# Patient Record
Sex: FEMALE | Race: WHITE | Employment: UNEMPLOYED | URBAN - METROPOLITAN AREA
[De-identification: names, ages, dates, MRNs, and addresses within clinical notes are randomized per-mention and may not be internally consistent; named-entity substitution may affect disease eponyms.]

---

## 2017-06-08 PROBLEM — G30.0 EARLY ONSET ALZHEIMER'S DEMENTIA WITHOUT BEHAVIORAL DISTURBANCE (HCC): Status: ACTIVE | Noted: 2017-06-08

## 2017-06-08 PROBLEM — F02.80 EARLY ONSET ALZHEIMER'S DEMENTIA WITHOUT BEHAVIORAL DISTURBANCE (HCC): Status: ACTIVE | Noted: 2017-06-08

## 2018-02-27 ENCOUNTER — HOSPITAL ENCOUNTER (OUTPATIENT)
Dept: MAMMOGRAPHY | Age: 58
Discharge: HOME | End: 2018-02-27
Attending: FAMILY MEDICINE
Payer: COMMERCIAL

## 2018-02-27 DIAGNOSIS — Z12.31 SCREENING MAMMOGRAM, ENCOUNTER FOR: ICD-10-CM

## 2018-02-27 PROCEDURE — 77067 SCR MAMMO BI INCL CAD: CPT

## 2018-06-05 PROBLEM — I83.91 VARICOSE VEINS OF RIGHT LOWER EXTREMITY: Status: ACTIVE | Noted: 2018-06-05

## 2018-06-05 PROBLEM — F32.A MILD DEPRESSION: Status: ACTIVE | Noted: 2018-06-05

## 2019-07-22 ENCOUNTER — HOSPITAL ENCOUNTER (OUTPATIENT)
Dept: MAMMOGRAPHY | Age: 59
Discharge: HOME | End: 2019-07-22
Attending: FAMILY MEDICINE
Payer: MEDICARE

## 2019-07-22 DIAGNOSIS — Z12.31 VISIT FOR SCREENING MAMMOGRAM: ICD-10-CM

## 2019-07-22 PROCEDURE — 77067 SCR MAMMO BI INCL CAD: CPT

## 2021-04-06 PROCEDURE — 99285 EMERGENCY DEPT VISIT HI MDM: CPT

## 2021-04-06 PROCEDURE — 81003 URINALYSIS AUTO W/O SCOPE: CPT

## 2021-04-07 ENCOUNTER — APPOINTMENT (OUTPATIENT)
Dept: GENERAL RADIOLOGY | Age: 61
DRG: 100 | End: 2021-04-07
Payer: MEDICARE

## 2021-04-07 ENCOUNTER — APPOINTMENT (OUTPATIENT)
Dept: MRI IMAGING | Age: 61
DRG: 100 | End: 2021-04-07
Attending: STUDENT IN AN ORGANIZED HEALTH CARE EDUCATION/TRAINING PROGRAM
Payer: MEDICARE

## 2021-04-07 ENCOUNTER — HOSPITAL ENCOUNTER (INPATIENT)
Age: 61
LOS: 4 days | Discharge: HOME HEALTH | DRG: 100 | End: 2021-04-11
Admitting: STUDENT IN AN ORGANIZED HEALTH CARE EDUCATION/TRAINING PROGRAM
Payer: MEDICARE

## 2021-04-07 ENCOUNTER — APPOINTMENT (OUTPATIENT)
Dept: CT IMAGING | Age: 61
DRG: 100 | End: 2021-04-07
Payer: MEDICARE

## 2021-04-07 DIAGNOSIS — R56.9 SEIZURE (HCC): ICD-10-CM

## 2021-04-07 DIAGNOSIS — F03.91 DEMENTIA WITH BEHAVIORAL DISTURBANCE, UNSPECIFIED DEMENTIA TYPE: Primary | ICD-10-CM

## 2021-04-07 DIAGNOSIS — G30.0 EARLY ONSET ALZHEIMER'S DEMENTIA WITHOUT BEHAVIORAL DISTURBANCE (HCC): ICD-10-CM

## 2021-04-07 DIAGNOSIS — N39.0 URINARY TRACT INFECTION WITHOUT HEMATURIA, SITE UNSPECIFIED: ICD-10-CM

## 2021-04-07 DIAGNOSIS — F02.80 EARLY ONSET ALZHEIMER'S DEMENTIA WITHOUT BEHAVIORAL DISTURBANCE (HCC): ICD-10-CM

## 2021-04-07 LAB
ALBUMIN SERPL-MCNC: 3.6 G/DL (ref 3.5–5)
ALBUMIN/GLOB SERPL: 0.9 {RATIO} (ref 1.2–3.5)
ALP SERPL-CCNC: 96 U/L (ref 50–136)
ALT SERPL-CCNC: 28 U/L (ref 12–65)
ANION GAP SERPL CALC-SCNC: 5 MMOL/L (ref 7–16)
AST SERPL-CCNC: 27 U/L (ref 15–37)
ATRIAL RATE: 64 BPM
BACTERIA URNS QL MICRO: ABNORMAL /HPF
BASOPHILS # BLD: 0.1 K/UL (ref 0–0.2)
BASOPHILS NFR BLD: 0 % (ref 0–2)
BILIRUB SERPL-MCNC: 0.3 MG/DL (ref 0.2–1.1)
BUN SERPL-MCNC: 26 MG/DL (ref 6–23)
CALCIUM SERPL-MCNC: 9.4 MG/DL (ref 8.3–10.4)
CALCULATED P AXIS, ECG09: 38 DEGREES
CALCULATED R AXIS, ECG10: 67 DEGREES
CALCULATED T AXIS, ECG11: 83 DEGREES
CASTS URNS QL MICRO: ABNORMAL /LPF
CHLORIDE SERPL-SCNC: 110 MMOL/L (ref 98–107)
CO2 SERPL-SCNC: 24 MMOL/L (ref 21–32)
CREAT SERPL-MCNC: 1.09 MG/DL (ref 0.6–1)
DIAGNOSIS, 93000: NORMAL
DIFFERENTIAL METHOD BLD: ABNORMAL
EOSINOPHIL # BLD: 0.2 K/UL (ref 0–0.8)
EOSINOPHIL NFR BLD: 1 % (ref 0.5–7.8)
EPI CELLS #/AREA URNS HPF: 0 /HPF
ERYTHROCYTE [DISTWIDTH] IN BLOOD BY AUTOMATED COUNT: 12.7 % (ref 11.9–14.6)
GLOBULIN SER CALC-MCNC: 3.9 G/DL (ref 2.3–3.5)
GLUCOSE BLD STRIP.AUTO-MCNC: 114 MG/DL (ref 65–100)
GLUCOSE SERPL-MCNC: 122 MG/DL (ref 65–100)
HCT VFR BLD AUTO: 40.5 % (ref 35.8–46.3)
HGB BLD-MCNC: 13.6 G/DL (ref 11.7–15.4)
IMM GRANULOCYTES # BLD AUTO: 0.3 K/UL (ref 0–0.5)
IMM GRANULOCYTES NFR BLD AUTO: 2 % (ref 0–5)
LACTATE SERPL-SCNC: 2 MMOL/L (ref 0.4–2)
LACTATE SERPL-SCNC: 2.6 MMOL/L (ref 0.4–2)
LYMPHOCYTES # BLD: 2.5 K/UL (ref 0.5–4.6)
LYMPHOCYTES NFR BLD: 16 % (ref 13–44)
MCH RBC QN AUTO: 31.9 PG (ref 26.1–32.9)
MCHC RBC AUTO-ENTMCNC: 33.6 G/DL (ref 31.4–35)
MCV RBC AUTO: 94.8 FL (ref 79.6–97.8)
MONOCYTES # BLD: 1 K/UL (ref 0.1–1.3)
MONOCYTES NFR BLD: 6 % (ref 4–12)
NEUTS SEG # BLD: 11.8 K/UL (ref 1.7–8.2)
NEUTS SEG NFR BLD: 75 % (ref 43–78)
NRBC # BLD: 0 K/UL (ref 0–0.2)
P-R INTERVAL, ECG05: 172 MS
PLATELET # BLD AUTO: 303 K/UL (ref 150–450)
PMV BLD AUTO: 8.7 FL (ref 9.4–12.3)
POTASSIUM SERPL-SCNC: 4.2 MMOL/L (ref 3.5–5.1)
PROT SERPL-MCNC: 7.5 G/DL (ref 6.3–8.2)
Q-T INTERVAL, ECG07: 428 MS
QRS DURATION, ECG06: 98 MS
QTC CALCULATION (BEZET), ECG08: 441 MS
RBC # BLD AUTO: 4.27 M/UL (ref 4.05–5.2)
RBC #/AREA URNS HPF: ABNORMAL /HPF
SERVICE CMNT-IMP: ABNORMAL
SODIUM SERPL-SCNC: 139 MMOL/L (ref 136–145)
TSH SERPL DL<=0.005 MIU/L-ACNC: 2.1 UIU/ML (ref 0.36–3.74)
VENTRICULAR RATE, ECG03: 64 BPM
WBC # BLD AUTO: 15.8 K/UL (ref 4.3–11.1)
WBC URNS QL MICRO: ABNORMAL /HPF

## 2021-04-07 PROCEDURE — 74011000258 HC RX REV CODE- 258: Performed by: STUDENT IN AN ORGANIZED HEALTH CARE EDUCATION/TRAINING PROGRAM

## 2021-04-07 PROCEDURE — A9576 INJ PROHANCE MULTIPACK: HCPCS | Performed by: STUDENT IN AN ORGANIZED HEALTH CARE EDUCATION/TRAINING PROGRAM

## 2021-04-07 PROCEDURE — 65270000029 HC RM PRIVATE

## 2021-04-07 PROCEDURE — 96375 TX/PRO/DX INJ NEW DRUG ADDON: CPT

## 2021-04-07 PROCEDURE — 81015 MICROSCOPIC EXAM OF URINE: CPT

## 2021-04-07 PROCEDURE — 77010033678 HC OXYGEN DAILY

## 2021-04-07 PROCEDURE — 71045 X-RAY EXAM CHEST 1 VIEW: CPT

## 2021-04-07 PROCEDURE — 95816 EEG AWAKE AND DROWSY: CPT | Performed by: STUDENT IN AN ORGANIZED HEALTH CARE EDUCATION/TRAINING PROGRAM

## 2021-04-07 PROCEDURE — 96372 THER/PROPH/DIAG INJ SC/IM: CPT

## 2021-04-07 PROCEDURE — 74011636320 HC RX REV CODE- 636/320: Performed by: STUDENT IN AN ORGANIZED HEALTH CARE EDUCATION/TRAINING PROGRAM

## 2021-04-07 PROCEDURE — 74011000258 HC RX REV CODE- 258

## 2021-04-07 PROCEDURE — 80053 COMPREHEN METABOLIC PANEL: CPT

## 2021-04-07 PROCEDURE — 96376 TX/PRO/DX INJ SAME DRUG ADON: CPT

## 2021-04-07 PROCEDURE — 92610 EVALUATE SWALLOWING FUNCTION: CPT

## 2021-04-07 PROCEDURE — 96366 THER/PROPH/DIAG IV INF ADDON: CPT

## 2021-04-07 PROCEDURE — 70553 MRI BRAIN STEM W/O & W/DYE: CPT

## 2021-04-07 PROCEDURE — 70450 CT HEAD/BRAIN W/O DYE: CPT

## 2021-04-07 PROCEDURE — 83605 ASSAY OF LACTIC ACID: CPT

## 2021-04-07 PROCEDURE — 94760 N-INVAS EAR/PLS OXIMETRY 1: CPT

## 2021-04-07 PROCEDURE — 82962 GLUCOSE BLOOD TEST: CPT

## 2021-04-07 PROCEDURE — 85025 COMPLETE CBC W/AUTO DIFF WBC: CPT

## 2021-04-07 PROCEDURE — 84443 ASSAY THYROID STIM HORMONE: CPT

## 2021-04-07 PROCEDURE — 96365 THER/PROPH/DIAG IV INF INIT: CPT

## 2021-04-07 PROCEDURE — 2709999900 HC NON-CHARGEABLE SUPPLY

## 2021-04-07 PROCEDURE — 74011250636 HC RX REV CODE- 250/636

## 2021-04-07 PROCEDURE — 93005 ELECTROCARDIOGRAM TRACING: CPT

## 2021-04-07 PROCEDURE — 87086 URINE CULTURE/COLONY COUNT: CPT

## 2021-04-07 PROCEDURE — 74011250637 HC RX REV CODE- 250/637: Performed by: STUDENT IN AN ORGANIZED HEALTH CARE EDUCATION/TRAINING PROGRAM

## 2021-04-07 PROCEDURE — 74011250636 HC RX REV CODE- 250/636: Performed by: STUDENT IN AN ORGANIZED HEALTH CARE EDUCATION/TRAINING PROGRAM

## 2021-04-07 RX ORDER — DIPHENHYDRAMINE HYDROCHLORIDE 50 MG/ML
25 INJECTION, SOLUTION INTRAMUSCULAR; INTRAVENOUS
Status: COMPLETED | OUTPATIENT
Start: 2021-04-07 | End: 2021-04-07

## 2021-04-07 RX ORDER — DONEPEZIL HYDROCHLORIDE 5 MG/1
10 TABLET, FILM COATED ORAL DAILY
Status: DISCONTINUED | OUTPATIENT
Start: 2021-04-07 | End: 2021-04-11 | Stop reason: HOSPADM

## 2021-04-07 RX ORDER — LORAZEPAM 2 MG/ML
1 INJECTION INTRAMUSCULAR
Status: COMPLETED | OUTPATIENT
Start: 2021-04-07 | End: 2021-04-07

## 2021-04-07 RX ORDER — LORAZEPAM 0.5 MG/1
0.5 TABLET ORAL 2 TIMES DAILY
Status: DISCONTINUED | OUTPATIENT
Start: 2021-04-07 | End: 2021-04-11 | Stop reason: HOSPADM

## 2021-04-07 RX ORDER — FACIAL-BODY WIPES
10 EACH TOPICAL DAILY PRN
Status: DISCONTINUED | OUTPATIENT
Start: 2021-04-07 | End: 2021-04-11 | Stop reason: HOSPADM

## 2021-04-07 RX ORDER — METOPROLOL SUCCINATE 50 MG/1
50 TABLET, EXTENDED RELEASE ORAL DAILY
COMMUNITY

## 2021-04-07 RX ORDER — ACETAMINOPHEN 650 MG/1
650 SUPPOSITORY RECTAL
Status: DISCONTINUED | OUTPATIENT
Start: 2021-04-07 | End: 2021-04-11 | Stop reason: HOSPADM

## 2021-04-07 RX ORDER — MEMANTINE HYDROCHLORIDE 5 MG/1
10 TABLET ORAL 2 TIMES DAILY
Status: DISCONTINUED | OUTPATIENT
Start: 2021-04-07 | End: 2021-04-11 | Stop reason: HOSPADM

## 2021-04-07 RX ORDER — PROPRANOLOL HYDROCHLORIDE 20 MG/1
20 TABLET ORAL 2 TIMES DAILY
COMMUNITY

## 2021-04-07 RX ORDER — ONDANSETRON 2 MG/ML
4 INJECTION INTRAMUSCULAR; INTRAVENOUS
Status: DISCONTINUED | OUTPATIENT
Start: 2021-04-07 | End: 2021-04-11 | Stop reason: HOSPADM

## 2021-04-07 RX ORDER — LORAZEPAM 1 MG/1
1 TABLET ORAL 3 TIMES DAILY
Status: ON HOLD | COMMUNITY
End: 2021-04-11 | Stop reason: SDUPTHER

## 2021-04-07 RX ORDER — LORAZEPAM 2 MG/ML
INJECTION INTRAMUSCULAR
Status: COMPLETED
Start: 2021-04-07 | End: 2021-04-07

## 2021-04-07 RX ORDER — AMLODIPINE BESYLATE 5 MG/1
5 TABLET ORAL DAILY
Status: DISCONTINUED | OUTPATIENT
Start: 2021-04-07 | End: 2021-04-09

## 2021-04-07 RX ORDER — QUETIAPINE FUMARATE 25 MG/1
25 TABLET, FILM COATED ORAL
COMMUNITY

## 2021-04-07 RX ORDER — LORAZEPAM 0.5 MG/1
1 TABLET ORAL
Qty: 9 TAB | Refills: 0 | Status: SHIPPED | OUTPATIENT
Start: 2021-04-07 | End: 2021-04-11

## 2021-04-07 RX ORDER — METOPROLOL SUCCINATE 50 MG/1
50 TABLET, EXTENDED RELEASE ORAL DAILY
Status: DISCONTINUED | OUTPATIENT
Start: 2021-04-07 | End: 2021-04-11 | Stop reason: HOSPADM

## 2021-04-07 RX ORDER — HALOPERIDOL 5 MG/ML
10 INJECTION INTRAMUSCULAR
Status: DISCONTINUED | OUTPATIENT
Start: 2021-04-07 | End: 2021-04-07

## 2021-04-07 RX ORDER — CEFPODOXIME PROXETIL 100 MG/1
100 TABLET, FILM COATED ORAL 2 TIMES DAILY
Qty: 14 TAB | Refills: 0 | Status: SHIPPED | OUTPATIENT
Start: 2021-04-07 | End: 2021-04-11 | Stop reason: SDUPTHER

## 2021-04-07 RX ORDER — SODIUM CHLORIDE 0.9 % (FLUSH) 0.9 %
5-40 SYRINGE (ML) INJECTION EVERY 8 HOURS
Status: DISCONTINUED | OUTPATIENT
Start: 2021-04-07 | End: 2021-04-11 | Stop reason: HOSPADM

## 2021-04-07 RX ORDER — LORAZEPAM 2 MG/ML
2 INJECTION INTRAMUSCULAR
Status: DISCONTINUED | OUTPATIENT
Start: 2021-04-07 | End: 2021-04-11 | Stop reason: HOSPADM

## 2021-04-07 RX ORDER — SODIUM CHLORIDE 9 MG/ML
75 INJECTION, SOLUTION INTRAVENOUS CONTINUOUS
Status: DISCONTINUED | OUTPATIENT
Start: 2021-04-07 | End: 2021-04-09

## 2021-04-07 RX ORDER — PROMETHAZINE HYDROCHLORIDE 25 MG/1
25 TABLET ORAL
Status: DISCONTINUED | OUTPATIENT
Start: 2021-04-07 | End: 2021-04-11 | Stop reason: HOSPADM

## 2021-04-07 RX ORDER — POLYETHYLENE GLYCOL 3350 17 G/17G
17 POWDER, FOR SOLUTION ORAL DAILY PRN
Status: DISCONTINUED | OUTPATIENT
Start: 2021-04-07 | End: 2021-04-11 | Stop reason: HOSPADM

## 2021-04-07 RX ORDER — FAMOTIDINE 20 MG/1
20 TABLET, FILM COATED ORAL
Status: DISCONTINUED | OUTPATIENT
Start: 2021-04-07 | End: 2021-04-11 | Stop reason: HOSPADM

## 2021-04-07 RX ORDER — ACETAMINOPHEN 325 MG/1
650 TABLET ORAL
Status: DISCONTINUED | OUTPATIENT
Start: 2021-04-07 | End: 2021-04-11 | Stop reason: HOSPADM

## 2021-04-07 RX ORDER — ESCITALOPRAM OXALATE 10 MG/1
10 TABLET ORAL EVERY EVENING
Status: DISCONTINUED | OUTPATIENT
Start: 2021-04-07 | End: 2021-04-09

## 2021-04-07 RX ORDER — SODIUM CHLORIDE 0.9 % (FLUSH) 0.9 %
10 SYRINGE (ML) INJECTION
Status: COMPLETED | OUTPATIENT
Start: 2021-04-07 | End: 2021-04-07

## 2021-04-07 RX ORDER — FAMOTIDINE 20 MG/1
20 TABLET, FILM COATED ORAL 2 TIMES DAILY
Status: DISCONTINUED | OUTPATIENT
Start: 2021-04-07 | End: 2021-04-11 | Stop reason: HOSPADM

## 2021-04-07 RX ORDER — ENOXAPARIN SODIUM 100 MG/ML
40 INJECTION SUBCUTANEOUS DAILY
Status: DISCONTINUED | OUTPATIENT
Start: 2021-04-07 | End: 2021-04-08

## 2021-04-07 RX ORDER — HALOPERIDOL 5 MG/ML
5 INJECTION INTRAMUSCULAR
Status: COMPLETED | OUTPATIENT
Start: 2021-04-07 | End: 2021-04-07

## 2021-04-07 RX ORDER — PROPRANOLOL HYDROCHLORIDE 10 MG/1
20 TABLET ORAL 2 TIMES DAILY
Status: DISCONTINUED | OUTPATIENT
Start: 2021-04-07 | End: 2021-04-11 | Stop reason: HOSPADM

## 2021-04-07 RX ORDER — SODIUM CHLORIDE 0.9 % (FLUSH) 0.9 %
5-40 SYRINGE (ML) INJECTION AS NEEDED
Status: DISCONTINUED | OUTPATIENT
Start: 2021-04-07 | End: 2021-04-11 | Stop reason: HOSPADM

## 2021-04-07 RX ADMIN — LORAZEPAM 1 MG: 2 INJECTION INTRAMUSCULAR; INTRAVENOUS at 08:20

## 2021-04-07 RX ADMIN — PROPRANOLOL HYDROCHLORIDE 20 MG: 10 TABLET ORAL at 18:19

## 2021-04-07 RX ADMIN — Medication 10 ML: at 14:28

## 2021-04-07 RX ADMIN — PROPRANOLOL HYDROCHLORIDE 20 MG: 10 TABLET ORAL at 13:42

## 2021-04-07 RX ADMIN — AMLODIPINE BESYLATE 5 MG: 5 TABLET ORAL at 13:42

## 2021-04-07 RX ADMIN — FAMOTIDINE 20 MG: 20 TABLET ORAL at 13:42

## 2021-04-07 RX ADMIN — HALOPERIDOL LACTATE 5 MG: 5 INJECTION, SOLUTION INTRAMUSCULAR at 01:18

## 2021-04-07 RX ADMIN — MEMANTINE HYDROCHLORIDE 10 MG: 5 TABLET ORAL at 13:42

## 2021-04-07 RX ADMIN — ESCITALOPRAM OXALATE 10 MG: 10 TABLET ORAL at 18:19

## 2021-04-07 RX ADMIN — METOPROLOL SUCCINATE 50 MG: 50 TABLET, EXTENDED RELEASE ORAL at 13:42

## 2021-04-07 RX ADMIN — MEMANTINE HYDROCHLORIDE 10 MG: 5 TABLET ORAL at 18:19

## 2021-04-07 RX ADMIN — LEVETIRACETAM 500 MG: 100 INJECTION, SOLUTION INTRAVENOUS at 13:42

## 2021-04-07 RX ADMIN — ENOXAPARIN SODIUM 40 MG: 40 INJECTION SUBCUTANEOUS at 13:42

## 2021-04-07 RX ADMIN — Medication 10 ML: at 16:19

## 2021-04-07 RX ADMIN — LORAZEPAM 1 MG: 2 INJECTION INTRAMUSCULAR; INTRAVENOUS at 01:19

## 2021-04-07 RX ADMIN — LORAZEPAM 0.5 MG: 0.5 TABLET ORAL at 12:44

## 2021-04-07 RX ADMIN — CEFTRIAXONE SODIUM 1 G: 1 INJECTION, POWDER, FOR SOLUTION INTRAMUSCULAR; INTRAVENOUS at 06:36

## 2021-04-07 RX ADMIN — SODIUM CHLORIDE 75 ML/HR: 900 INJECTION, SOLUTION INTRAVENOUS at 10:17

## 2021-04-07 RX ADMIN — SODIUM CHLORIDE 1000 ML: 900 INJECTION, SOLUTION INTRAVENOUS at 06:38

## 2021-04-07 RX ADMIN — LORAZEPAM 2 MG: 2 INJECTION INTRAMUSCULAR; INTRAVENOUS at 14:12

## 2021-04-07 RX ADMIN — DIPHENHYDRAMINE HYDROCHLORIDE 25 MG: 50 INJECTION, SOLUTION INTRAMUSCULAR; INTRAVENOUS at 01:19

## 2021-04-07 RX ADMIN — FAMOTIDINE 20 MG: 20 TABLET ORAL at 18:19

## 2021-04-07 RX ADMIN — GADOTERIDOL 17 ML: 279.3 INJECTION, SOLUTION INTRAVENOUS at 16:19

## 2021-04-07 RX ADMIN — Medication 10 ML: at 21:29

## 2021-04-07 NOTE — PROGRESS NOTES
Initial visit in the ER, a spiritual presence, emotional presence and prayer were provided for the patient.        Xu Escoto, [address] [facility], [facility]

## 2021-04-07 NOTE — DISCHARGE INSTRUCTIONS
Please continue to take Ativan 3 times daily at lower dose to prevent withdrawal. Please discuss with your neurologist further medication recommendations. We have not continued Keppra during this hospitalization as we believe the seizure was provoked by the benzo withdrawal. We are setting you up with home health therapy to assist with your continued physical therapy and occupational therapy at home.

## 2021-04-07 NOTE — ED PROVIDER NOTES
61-year-old female brought into the ER by EMS for abnormal behavior. Patient family reports that she has \"stage VII dementia\" and her dementia had early onset.  states that they ran out of her Ativan today she is not had any since yesterday this is when her behavior started to change. Her  reports several episodes of shaking that lasted for 30 seconds each does not have all the characteristics of withdrawal seizures. Altered mental status   This is a chronic problem. The current episode started yesterday. The problem has not changed since onset. Associated symptoms include confusion and agitation. Mental status baseline is severe dementia. Risk factors include dementia. Past Medical History:   Diagnosis Date    Menopause        Past Surgical History:   Procedure Laterality Date    HX BREAST REDUCTION Bilateral 1982         No family history on file.     Social History     Socioeconomic History    Marital status:      Spouse name: Not on file    Number of children: Not on file    Years of education: Not on file    Highest education level: Not on file   Occupational History    Not on file   Social Needs    Financial resource strain: Not on file    Food insecurity     Worry: Not on file     Inability: Not on file    Transportation needs     Medical: Not on file     Non-medical: Not on file   Tobacco Use    Smoking status: Never Smoker    Smokeless tobacco: Never Used   Substance and Sexual Activity    Alcohol use: Yes     Comment: Wine    Drug use: Not on file    Sexual activity: Not on file   Lifestyle    Physical activity     Days per week: Not on file     Minutes per session: Not on file    Stress: Not on file   Relationships    Social connections     Talks on phone: Not on file     Gets together: Not on file     Attends Ca[employer]ism service: Not on file     Active member of club or organization: Not on file     Attends meetings of clubs or organizations: Not on file Relationship status: Not on file    Intimate partner violence     Fear of current or ex partner: Not on file     Emotionally abused: Not on file     Physically abused: Not on file     Forced sexual activity: Not on file   Other Topics Concern    Not on file   Social History Narrative    Not on file         ALLERGIES: Patient has no known allergies. Review of Systems   Constitutional: Negative. Negative for activity change. HENT: Negative. Eyes: Negative. Respiratory: Negative. Cardiovascular: Negative. Gastrointestinal: Negative. Genitourinary: Negative. Musculoskeletal: Negative. Skin: Negative. Neurological: Negative. Psychiatric/Behavioral: Positive for agitation and confusion. All other systems reviewed and are negative. There were no vitals filed for this visit. Physical Exam  Vitals signs and nursing note reviewed. Constitutional:       General: She is not in acute distress. Appearance: Normal appearance. She is well-developed. She is not ill-appearing, toxic-appearing or diaphoretic. HENT:      Head: Normocephalic and atraumatic. No right periorbital erythema or left periorbital erythema. Jaw: There is normal jaw occlusion. Salivary Glands: Right salivary gland is not diffusely enlarged. Left salivary gland is not diffusely enlarged. Right Ear: External ear normal.      Left Ear: External ear normal.      Nose: Nose normal. No congestion or rhinorrhea. Mouth/Throat:      Mouth: Mucous membranes are moist.      Pharynx: No oropharyngeal exudate or posterior oropharyngeal erythema. Eyes:      General: Lids are normal. No scleral icterus. Right eye: No discharge. Left eye: No discharge. Extraocular Movements: Extraocular movements intact. Conjunctiva/sclera: Conjunctivae normal.      Right eye: Right conjunctiva is not injected. Left eye: Left conjunctiva is not injected.       Pupils: Pupils are equal, round, and reactive to light. Neck:      Musculoskeletal: Full passive range of motion without pain, normal range of motion and neck supple. Normal range of motion. No erythema, neck rigidity, injury, pain with movement or muscular tenderness. Thyroid: No thyroid mass. Vascular: No JVD. Trachea: Trachea and phonation normal.   Cardiovascular:      Rate and Rhythm: Normal rate and regular rhythm. Pulses: Normal pulses. Heart sounds: Normal heart sounds. Heart sounds not distant. No murmur. No friction rub. No gallop. Pulmonary:      Effort: Pulmonary effort is normal. No tachypnea, accessory muscle usage, respiratory distress or retractions. Breath sounds: No stridor. No decreased breath sounds, wheezing, rhonchi or rales. Chest:      Chest wall: No tenderness. Abdominal:      General: Abdomen is flat. Bowel sounds are normal. There is no distension. There are no signs of injury. Palpations: Abdomen is soft. There is no fluid wave, mass or pulsatile mass. Tenderness: There is no abdominal tenderness. There is no guarding or rebound. Musculoskeletal: Normal range of motion. General: No swelling, tenderness, deformity or signs of injury. Right lower leg: No edema. Left lower leg: No edema. Lymphadenopathy:      Cervical: No cervical adenopathy. Skin:     General: Skin is warm and dry. Capillary Refill: Capillary refill takes less than 2 seconds. Coloration: Skin is not jaundiced or pale. Findings: No bruising, erythema or rash. Neurological:      General: No focal deficit present. Mental Status: She is alert and oriented to person, place, and time. Mental status is at baseline. GCS: GCS eye subscore is 4. GCS verbal subscore is 5. GCS motor subscore is 6. Cranial Nerves: No dysarthria or facial asymmetry. Sensory: No sensory deficit. Motor: No weakness or tremor.       Coordination: Coordination normal.   Psychiatric:         Attention and Perception: She is inattentive. Mood and Affect: Mood normal. Affect is inappropriate. Behavior: Behavior normal. Behavior is cooperative. Thought Content: Thought content normal.         Cognition and Memory: Cognition is impaired. Memory is impaired. She exhibits impaired recent memory and impaired remote memory. Judgment: Judgment is impulsive. MDM  Number of Diagnoses or Management Options  Dementia with behavioral disturbance, unspecified dementia type [facility])  Diagnosis management comments:  is concerned that her prescription for Ativan has run out and he could not get his doctor's office to address this problem yesterday. She has been on Ativan according to the care provider for quite some time. The shaking type behavior that he witnessed does not sound like a true seizure or withdrawal seizures. due to the effects of withdrawing from benzodiazepine patient will be giving a very short course and time to talk to her primary care doctor about further care. Patient does demonstrate some behavior that does not seem to be dementia related but more psychiatric.        Amount and/or Complexity of Data Reviewed  Clinical lab tests: ordered and reviewed  Tests in the radiology section of CPT®: ordered and reviewed  Tests in the medicine section of CPT®: ordered and reviewed  Decide to obtain previous medical records or to obtain history from someone other than the patient: yes  Review and summarize past medical records: yes  Independent visualization of images, tracings, or specimens: yes    Risk of Complications, Morbidity, and/or Mortality  Presenting problems: high  Diagnostic procedures: high  Management options: high    Patient Progress  Patient progress: stable         Procedures

## 2021-04-07 NOTE — PROGRESS NOTES
LTG: Patient will tolerate least restrictive diet without overt signs or symptoms of airway compromise. STG: Patient will tolerate po trials without overt signs or symptoms of airway compromise in efforts to identify safest oral diet  STG: Patient will participate in modified barium swallow study as clinically indicated. SPEECH LANGUAGE PATHOLOGY: DYSPHAGIA- Initial Assessment    NAME/AGE/GENDER: Bettye Barrett is a 64 y.o. female  DATE: 4/7/2021  PRIMARY DIAGNOSIS: Seizure ([facility] [address] [R56.9]      ICD-10: Treatment Diagnosis: R13.12 Dysphagia, Oropharyngeal Phase    RECOMMENDATIONS   DIET:    NPO    MEDICATIONS: Crushed in puree  if critical     ASPIRATION PRECAUTIONS  · Slow rate of intake  · Small bites/sips  · Upright at 90 degrees during meal     COMPENSATORY STRATEGIES/MODIFICATIONS  · None     RECOMMENDATIONS for CONTINUED SPEECH THERAPY:   YES: Anticipate need for ongoing speech therapy during this hospitalization and at next level of care. ASSESSMENT   Patient presents with oral dysphagia complicated by impaired cognition. No overt s/sx airway compromise with puree and ice chips. Patient unable to drink via cup and straw due to mental status-biting straw and licking water from cup. Recommend NPO except critical meds crushed in puree when awake/alert. EDUCATION:  · Recommendations discussed with Patient and RN  CONTINUATION OF SKILLED SERVICES/MEDICAL NECESSITY:   Patient is expected to demonstrate progress in  swallow strength, swallow timeliness, swallow function, diet tolerance and swallow safety in order to  improve swallow safety, work toward diet advancement and decrease aspiration risk.  Patient continues to require skilled intervention due to dysphagia. REHABILITATION POTENTIAL FOR STATED GOALS: Good    PLAN    FREQUENCY/DURATION: Continue to follow patient 3 times a week for duration of hospital stay to address above goals.     - Recommendations for next treatment session: Next treatment session will address po trials    SUBJECTIVE   Positioned upright. perseverative and confused. Automatic phrases and repetitive and inconsistent. Oxygen Device: NC  Pain: Pain Scale 1: FLACC  Pain Intensity 1: 0    History of Present Injury/Illness: Ms. Brenda Martinez  has a past medical history of Alzheimer's dementia ([facility] [address] Hypertension, and Menopause. . She also  has a past surgical history that includes hx breast reduction (Bilateral, 1982). PRECAUTIONS/ALLERGIES: Patient has no known allergies. Problem List:  (Impairments causing functional limitations):  1. Oropharyngeal dysphagia     Previous Dysphagia: RN reports  denied any at bedside.  reports eats regular/thin at baseline. Failed stand due to impaired level of alertness. Diet Prior to Evaluation: NPO    Orientation:  name in yes/no format    Cognitive-Linguistic Screening:   Alertness  o Alert   Speech Production:   o Fully intelligible   Expressive Language:  o perseverative; automatic speech    o Able to express likes/dislikes but also noted inconsistencies with this    Receptive Language:  o unable to follow commands or answer questions appropriately due to confusion   Cognition:   o Impaired. Advanced dementia at baseline per chart. Prior Level of Function: requires care from . Recommendations: Given results of screening, no acute evaluation warranted    OBJECTIVE   Oral Motor:   · COMMENTS: no command following    Dysphagia evaluation:   Patient consumed trials of ice chips and puree. Sticking tongue out and licking water across multiple cup presentations and biting straw. Adequate acceptance and oral clearance with puree and meds crushed in puree with RN present. No overt s/sx airway compromise.        Tool Used: Dysphagia Outcome and Severity Scale (CARLA)    Score Comments   Normal Diet  [] 7 With no strategies or extra time needed   Functional Swallow  [] 6 May have mild oral or pharyngeal delay   Mild Dysphagia  [] 5 Which may require one diet consistency restricted    Mild-Moderate Dysphagia  [] 4 With 1-2 diet consistencies restricted   Moderate Dysphagia  [] 3 With 2 or more diet consistencies restricted   Moderate-Severe Dysphagia  [] 2 With partial PO strategies (trials with ST only)   Severe Dysphagia  [] 1 With inability to tolerate any PO safely      Score:  Initial: 2 Most Recent: x (Date 04/07/21 )   Interpretation of Tool: The Dysphagia Outcome and Severity Scale (CARLA) is a simple, easy-to-use, 7-point scale developed to systematically rate the functional severity of dysphagia based on objective assessment and make recommendations for diet level, independence level, and type of nutrition. Current Medications:   No current facility-administered medications on file prior to encounter. Current Outpatient Medications on File Prior to Encounter   Medication Sig Dispense Refill    metoprolol succinate (Toprol XL) 50 mg XL tablet Take 50 mg by mouth daily.  LORazepam (Ativan) 1 mg tablet Take 1 mg by mouth three (3) times daily.  propranoloL (INDERAL) 20 mg tablet Take 20 mg by mouth two (2) times a day.  QUEtiapine (SEROqueL) 25 mg tablet Take 25 mg by mouth nightly.  escitalopram oxalate (LEXAPRO) 10 mg tablet 1 every day for depression (Patient taking differently: Take 30 mg by mouth daily. 1 every day for depression) 90 Tab 12    donepezil (ARICEPT) 10 mg tablet 1 every day for memory (Patient taking differently: 10 mg nightly. 1 every day for memory) 90 Tab 6    memantine (NAMENDA) 10 mg tablet 1 every day for memory (Patient taking differently: Take 10 mg by mouth two (2) times a day.  1 every day for memory) 90 Tab 12    metroNIDAZOLE (METROGEL) 1 % topical gel Apply qhs for rosacea 45 g 0    [DISCONTINUED] estradiol (ESTRACE) 0.5 mg tablet 1 every day for hormone replacement 90 Tab 12    [DISCONTINUED] progesterone (PROMETRIUM) 100 mg capsule 1 every day for hormone replacement 90 Cap 12    [DISCONTINUED] amLODIPine-benazepril (LOTREL) 5-40 mg per capsule 1 every day for BP 90 Cap 0        INTERDISCIPLINARY COLLABORATION: RN    After treatment position/precautions:  · Upright in bed  · RN notified   · RN at bedside    Total Treatment Duration:   Time In: 1164  Time Out: 2809 [facility] RICKEY TERRIE QUIROZ, CCC-SLP

## 2021-04-07 NOTE — ED TRIAGE NOTES
Patient arrives via EMS from home for altered mental status. Family states patient has run out of ativan which she usually takes every day. patient has history of alzheimer's. EMS noted O2 sat of 88-90% placed on 3L nc with improved to 95-97%, patient does not wear oxygen at home. EMS . Family stated this is a similar behavior as a previous UTI.

## 2021-04-07 NOTE — ED NOTES
TRANSFER - OUT REPORT:    Verbal report given to Noris(name) on Suhail Christian  being transferred to [facility](unit) for routine progression of care       Report consisted of patients Situation, Background, Assessment and   Recommendations(SBAR). Information from the following report(s) SBAR was reviewed with the receiving nurse. Lines:   Peripheral IV 04/07/21 Right Hand (Active)   Site Assessment Clean, dry, & intact 04/07/21 0613   Phlebitis Assessment 0 04/07/21 0613   Infiltration Assessment 0 04/07/21 0613   Dressing Status Clean, dry, & intact 04/07/21 0613   Dressing Type 4 X 4;Transparent 04/07/21 0613   Hub Color/Line Status Pink 04/07/21 [id number]        Opportunity for questions and clarification was provided.       Patient transported with:   O2 @ 3 liters

## 2021-04-07 NOTE — H&P
[facility] History and Physical       Name:  Bill Mclena  Age:60 y.o. Sex:female   :  1960    MRN:  [id number]   PCP:  Reida Lefort., MD      Admit Date:  2021 12:33 AM   Chief Complaint: Seizure    Reason for Admission:   Seizure [facility]) [R56.9]    Assessment & Plan:     #Seizures  - s/p Ativan IV in ED for seizure activity  - likely from benzo withdrawal but may be at risk with underlying dementia  - start on Keppra IV BID  - will cont with ativan 0.5mg BID at lower dose (takes 1mg TID) to prevent possible withdrawal seizures  - obtain EEG and Brain MRI   - ativan IV prn for seizure activity  - consult neurology for assistance, follows Dr. Sai Moeller with neurology in outpatient setting    #UTI  - UA mildly positive  - will cover with rocephin  - follow up UCx    #IGNACIO  - Cr 1.09  - start on IVF  - recheck in AM    #Alzheimers Dementia  - cont home namenda and aricept  - cont seroquel 25mg at bedtime  - cont home lexapro    #HTN  - cont home toprol and propanolol    Disposition/Expected LOS: 1-2 midnight hospital stay for seiuzre workup. PT/OT/ST consulted  Diet: DIET NPO  VTE ppx: lovenox  GI ppx: pepcid  Code status: Full CODE  Surrogate decision-maker:  at bedside, Wilber Nation, [phone]      History of Presenting Illness:     Bill Mclean is a 64 y.o. female with medical history of early onset Alzheimer's dementia, hypertension, chronic benzo use, who presented to ED with seizure. Patient altered and likely postictal my examination the ED, Gilberto history obtained from  at bedside.  states that he put patient to bed last night in her normal state of health without any acute issues. She woke up in walk downstairs, which is not unusual for her with her underlying dementia.  stayed in bed with her for some time after, and subsequently found her to be convulsing, with whole body shaking.   He does not know if she lost any urine or stool, but thinks that she may have had some urinary incontinence. No evidence of tongue biting. Of note, patient is on Ativan 1 mg 3 times daily for her anxiety and agitation. Yesterday  noticed that she had ran out and she did not receive any Ativan yesterday.  denies any complaints at home, although patient mostly confused at baseline and does not make much sense with her words. Denies any fevers, chills, pain, dysuria, urinary issues, diarrhea, cough or congestion. He denies any previous personal or family history of seizure disorder. In the ED, patient again had seizure activity with whole body convulsing. Her seizures aborted with Ativan administration. She was post-ictal in the ED, confused and not making any sense with her words. Labs positive for mild UTI and IGNACIO. Review of Systems:  ROS limited due to patient's AMS.         Past Medical History:   Diagnosis Date    Alzheimer's dementia ([facility] [address]     Hypertension     Menopause        Past Surgical History:   Procedure Laterality Date    HX BREAST REDUCTION Bilateral        Family History : reviewed  Family History   Problem Relation Age of Onset    Stroke Mother         Social History     Tobacco Use    Smoking status: Former Smoker     Quit date:      Years since quittin.2    Smokeless tobacco: Never Used   Substance Use Topics    Alcohol use: Not Currently     Frequency: Never     Binge frequency: Never     Comment: Wine       No Known Allergies    Immunization History   Administered Date(s) Administered    Influenza Vaccine 2017, 2018    Tdap 2018         PTA Medications:  Current Outpatient Medications   Medication Instructions    cefpodoxime (VANTIN) 100 mg, Oral, 2 TIMES DAILY    donepezil (ARICEPT) 10 mg tablet 1 every day for memory    escitalopram oxalate (LEXAPRO) 10 mg tablet 1 every day for depression    LORazepam (ATIVAN) 1 mg, Oral, EVERY 8 HOURS AS NEEDED    LORazepam (ATIVAN) 1 mg, Oral, 3 TIMES DAILY    memantine (NAMENDA) 10 mg tablet 1 every day for memory    metoprolol succinate (TOPROL XL) 50 mg, Oral, DAILY    metroNIDAZOLE (METROGEL) 1 % topical gel Apply qhs for rosacea    propranoloL (INDERAL) 20 mg, Oral, 2 TIMES DAILY    QUEtiapine (SEROQUEL) 25 mg, Oral, EVERY BEDTIME       Objective:     Patient Vitals for the past 24 hrs:   Temp Pulse Resp BP SpO2   04/07/21 1030  83 21 (!) 161/106 96 %   04/07/21 0848  93 28 (!) 159/97 94 %   04/07/21 0830  93  (!) 161/101 97 %   04/07/21 0801  76 24 (!) 150/107    04/07/21 0730  75 22 (!) 157/104 94 %   04/07/21 0701  70 23 (!) 145/94 94 %   04/07/21 0601  73 22 (!) 146/108 94 %   04/07/21 0530  75 17 (!) 147/99 91 %   04/07/21 0501  72 21 (!) 139/92 94 %   04/07/21 0431  74 15 (!) 139/92 94 %   04/07/21 0401  68 21 (!) 140/89 95 %   04/07/21 0331  66 20 (!) 145/77 96 %   04/07/21 0301  68 19 (!) 142/89 96 %   04/07/21 0231  69 21 (!) 142/82 95 %   04/07/21 0203  68 17 (!) 198/93 95 %   04/07/21 0142     94 %   04/07/21 0142 97.8 °F (36.6 °C)       04/07/21 0131  65 18 113/72 94 %   04/07/21 0045  68  97/67 94 %   04/07/21 0040  67  98/66 93 %       Oxygen Therapy  O2 Sat (%): 96 % (04/07/21 1030)  Pulse via Oximetry: 83 beats per minute (04/07/21 1030)  O2 Device: Nasal cannula (04/07/21 1030)  O2 Flow Rate (L/min): 3 l/min (04/07/21 1030)    There is no height or weight on file to calculate BMI.     Physical Exam:    General:  No acute distress, speaking incoherent words  HEENT:  Pupils equal and reactive to light and accommodation, oropharynx is clear   Neck:   Supple, no lymphadenopathy, no JVD   Lungs:  Clear to auscultation bilaterally   CV:   Regular rate and rhythm with normal S1 and S2   Abdomen:  Soft, nontender, nondistended, normoactive bowel sounds   Extremities:  No cyanosis clubbing or edema   Neuro:  Nonfocal, moves all extremities  Psych:  Confused, post-ictal      Data Reviewed: I have reviewed all labs, meds, and studies. Recent Results (from the past 24 hour(s))   EKG, 12 LEAD, INITIAL    Collection Time: 04/07/21 12:54 AM   Result Value Ref Range    Ventricular Rate 64 BPM    Atrial Rate 64 BPM    P-R Interval 172 ms    QRS Duration 98 ms    Q-T Interval 428 ms    QTC Calculation (Bezet) 441 ms    Calculated P Axis 38 degrees    Calculated R Axis 67 degrees    Calculated T Axis 83 degrees    Diagnosis       !! AGE AND GENDER SPECIFIC ECG ANALYSIS !! Normal sinus rhythm  Nonspecific T wave abnormality  Abnormal ECG  When compared with ECG of 13-SEP-2000 06:57,  Vent. rate has decreased BY  42 BPM  ST elevation has replaced ST depression in Inferior leads  Non-specific change in ST segment in Lateral leads  Nonspecific T wave abnormality now evident in Anterolateral leads     CBC WITH AUTOMATED DIFF    Collection Time: 04/07/21  1:06 AM   Result Value Ref Range    WBC 15.8 (H) 4.3 - 11.1 K/uL    RBC 4.27 4.05 - 5.2 M/uL    HGB 13.6 11.7 - 15.4 g/dL    HCT 40.5 35.8 - 46.3 %    MCV 94.8 79.6 - 97.8 FL    MCH 31.9 26.1 - 32.9 PG    MCHC 33.6 31.4 - 35.0 g/dL    RDW 12.7 11.9 - 14.6 %    PLATELET 868 388 - 236 K/uL    MPV 8.7 (L) 9.4 - 12.3 FL    ABSOLUTE NRBC 0.00 0.0 - 0.2 K/uL    DF AUTOMATED      NEUTROPHILS 75 43 - 78 %    LYMPHOCYTES 16 13 - 44 %    MONOCYTES 6 4.0 - 12.0 %    EOSINOPHILS 1 0.5 - 7.8 %    BASOPHILS 0 0.0 - 2.0 %    IMMATURE GRANULOCYTES 2 0.0 - 5.0 %    ABS. NEUTROPHILS 11.8 (H) 1.7 - 8.2 K/UL    ABS. LYMPHOCYTES 2.5 0.5 - 4.6 K/UL    ABS. MONOCYTES 1.0 0.1 - 1.3 K/UL    ABS. EOSINOPHILS 0.2 0.0 - 0.8 K/UL    ABS. BASOPHILS 0.1 0.0 - 0.2 K/UL    ABS. IMM.  GRANS. 0.3 0.0 - 0.5 K/UL   METABOLIC PANEL, COMPREHENSIVE    Collection Time: 04/07/21  1:06 AM   Result Value Ref Range    Sodium 139 136 - 145 mmol/L    Potassium 4.2 3.5 - 5.1 mmol/L    Chloride 110 (H) 98 - 107 mmol/L    CO2 24 21 - 32 mmol/L    Anion gap 5 (L) 7 - 16 mmol/L    Glucose 122 (H) 65 - 100 mg/dL    BUN 26 (H) 6 - 23 MG/DL    Creatinine 1.09 (H) 0.6 - 1.0 MG/DL    GFR est AA >60 >60 ml/min/1.73m2    GFR est non-AA 55 (L) >60 ml/min/1.73m2    Calcium 9.4 8.3 - 10.4 MG/DL    Bilirubin, total 0.3 0.2 - 1.1 MG/DL    ALT (SGPT) 28 12 - 65 U/L    AST (SGOT) 27 15 - 37 U/L    Alk. phosphatase 96 50 - 136 U/L    Protein, total 7.5 6.3 - 8.2 g/dL    Albumin 3.6 3.5 - 5.0 g/dL    Globulin 3.9 (H) 2.3 - 3.5 g/dL    A-G Ratio 0.9 (L) 1.2 - 3.5     LACTIC ACID    Collection Time: 04/07/21  1:06 AM   Result Value Ref Range    Lactic acid 2.0 0.4 - 2.0 MMOL/L   GLUCOSE, POC    Collection Time: 04/07/21  1:27 AM   Result Value Ref Range    Glucose (POC) 114 (H) 65 - 100 mg/dL    Performed by Opal    URINE MICROSCOPIC    Collection Time: 04/07/21  2:00 AM   Result Value Ref Range    WBC 10-20 0 /hpf    RBC 5-10 0 /hpf    Epithelial cells 0 0 /hpf    Bacteria 3+ (H) 0 /hpf    Casts 3-5 0 /lpf   LACTIC ACID    Collection Time: 04/07/21  4:24 AM   Result Value Ref Range    Lactic acid 2.6 (H) 0.4 - 2.0 MMOL/L   TSH 3RD GENERATION    Collection Time: 04/07/21  4:24 AM   Result Value Ref Range    TSH 2.100 0.358 - 3.740 uIU/mL       EKG Results     Procedure 720 Value Units Date/Time    EKG, 12 LEAD, INITIAL [[id number]] Collected: 04/07/21 0054    Order Status: Completed Updated: 04/07/21 0617     Ventricular Rate 64 BPM      Atrial Rate 64 BPM      P-R Interval 172 ms      QRS Duration 98 ms      Q-T Interval 428 ms      QTC Calculation (Bezet) 441 ms      Calculated P Axis 38 degrees      Calculated R Axis 67 degrees      Calculated T Axis 83 degrees      Diagnosis --     !! AGE AND GENDER SPECIFIC ECG ANALYSIS !! Normal sinus rhythm  Nonspecific T wave abnormality  Abnormal ECG  When compared with ECG of 13-SEP-2000 06:57,  Vent.  rate has decreased BY  42 BPM  ST elevation has replaced ST depression in Inferior leads  Non-specific change in ST segment in Lateral leads  Nonspecific T wave abnormality now evident in Anterolateral leads            All Micro Results     None          Other Studies:  Ct Head Wo Cont    Result Date: 4/7/2021  CT HEAD WITHOUT CONTRAST, 4/7/2021 History: Abnormal behavior. Comparison: None Technique:   5 mm axial scans from the skull base to the vertex. All CT scans performed at this facility use one or all of the following: Automated exposure control, adjustment of the mA and/or kVp according to patient's size, iterative reconstruction. Findings:  No evidence of intracranial hemorrhage is seen. No abnormal extra-axial fluid collections are seen. Moderate chronic cortical volume loss is seen which does appear advanced for the patient's age. No evidence for acute hydrocephalus is seen. No evidence of midline shift or herniation is seen. No abnormal edema pattern is seen in a vascular distribution to suggest large artery infarction. Evaluation with bone windows shows no acute osseous changes. The visualized sinuses, middle ears, and mastoid air cells are well aerated. 1.  No acute intracranial process evident by noncontrast CT study of the head. 2. Chronic cortical volume loss which does appear advanced for the patient's age. This report was made using voice transcription. Despite my best efforts to avoid any, transcription errors may persist. If there is any question about the accuracy of the report or need for clarification, then please call [phone], or text me through perfectserv for clarification or correction. Xr Chest Port    Result Date: 4/7/2021  EXAM: XR CHEST PORT HISTORY: altered mental status. TECHNIQUE: A single frontal view of the chest was submitted. COMPARISON: None available. FINDINGS: The cardiac silhouette, mediastinum, and pulmonary vasculature are within normal limits. There is no consolidation, pleural effusion, or pneumothorax. No significant osseous abnormalities are observed. No evidence of an acute intrathoracic process. Medications:  Medications Administered      Medications Administered     0.9% sodium chloride infusion     Admin Date  04/07/2021 Action  New Bag Dose  75 mL/hr Rate  75 mL/hr Route  IntraVENous Administered By  Lory Burks          cefTRIAXone (ROCEPHIN) 1 g in 0.9% sodium chloride (MBP/ADV) 50 mL MBP     Admin Date  04/07/2021 Action  New Bag Dose  1 g Rate  100 mL/hr Route  IntraVENous Administered By  Gelacio Spencer, AMRIT          diphenhydrAMINE (BENADRYL) injection 25 mg     Admin Date  04/07/2021 Action  Given Dose  25 mg Route  IntraVENous Administered By  Gelacio Spencer, AMRIT          haloperidol lactate (HALDOL) injection 5 mg     Admin Date  04/07/2021 Action  Given Dose  5 mg Route  IntraMUSCular Administered By  Gelacio Spencer, AMRIT          LORazepam (ATIVAN) injection 1 mg     Admin Date  04/07/2021 Action  Given Dose  1 mg Route  IntraVENous Administered By  Gelacio Spencer RN           Admin Date  04/07/2021 Action  Given Dose  1 mg Route  IntraVENous Administered By  Lory Burks          sodium chloride 0.9 % bolus infusion 1,000 mL     Admin Date  04/07/2021 Action  New Bag Dose  1,000 mL Rate  1,000 mL/hr Route  IntraVENous Administered By  Gelacio Spencer RN                      Problem List:     Hospital Problems as of 4/7/2021 Date Reviewed: 4/7/2021          Codes Class Noted - Resolved POA    Seizure ([facility] ICD-10-CM: R56.9  ICD-9-CM: 780.39  4/7/2021 - Present Unknown        Early onset Alzheimer's dementia without behavioral disturbance ([facility] ICD-10-CM: G30.0, F02.80  ICD-9-CM: 331.0, 294.10  6/8/2017 - Present Yes        Benign essential HTN ICD-10-CM: I10  ICD-9-CM: 401.1  11/18/2015 - Present Yes               Signed By: Madhav Alvarez MD   [employer] [facility]    April 7, 2021  4:22 PM

## 2021-04-07 NOTE — PROGRESS NOTES
Problem: Falls - Risk of  Goal: *Absence of Falls  Description: Document Earline Sherman Fall Risk and appropriate interventions in the flowsheet.   Outcome: Progressing Towards Goal  Note: Fall Risk Interventions:  Mobility Interventions: Bed/chair exit alarm, Patient to call before getting OOB    Mentation Interventions: Adequate sleep, hydration, pain control, Bed/chair exit alarm    Medication Interventions: Bed/chair exit alarm, Teach patient to arise slowly    Elimination Interventions: Bed/chair exit alarm, Call light in reach              Problem: Patient Education: Go to Patient Education Activity  Goal: Patient/Family Education  Outcome: Progressing Towards Goal

## 2021-04-08 LAB
ANION GAP SERPL CALC-SCNC: 7 MMOL/L (ref 7–16)
BASOPHILS # BLD: 0 K/UL (ref 0–0.2)
BASOPHILS NFR BLD: 0 % (ref 0–2)
BUN SERPL-MCNC: 17 MG/DL (ref 6–23)
CALCIUM SERPL-MCNC: 8.5 MG/DL (ref 8.3–10.4)
CHLORIDE SERPL-SCNC: 112 MMOL/L (ref 98–107)
CO2 SERPL-SCNC: 22 MMOL/L (ref 21–32)
CREAT SERPL-MCNC: 0.74 MG/DL (ref 0.6–1)
DIFFERENTIAL METHOD BLD: ABNORMAL
EOSINOPHIL # BLD: 0.1 K/UL (ref 0–0.8)
EOSINOPHIL NFR BLD: 1 % (ref 0.5–7.8)
ERYTHROCYTE [DISTWIDTH] IN BLOOD BY AUTOMATED COUNT: 12.9 % (ref 11.9–14.6)
GLUCOSE BLD STRIP.AUTO-MCNC: 119 MG/DL (ref 65–100)
GLUCOSE SERPL-MCNC: 124 MG/DL (ref 65–100)
HCT VFR BLD AUTO: 35.2 % (ref 35.8–46.3)
HGB BLD-MCNC: 11.8 G/DL (ref 11.7–15.4)
IMM GRANULOCYTES # BLD AUTO: 0.1 K/UL (ref 0–0.5)
IMM GRANULOCYTES NFR BLD AUTO: 1 % (ref 0–5)
LYMPHOCYTES # BLD: 2 K/UL (ref 0.5–4.6)
LYMPHOCYTES NFR BLD: 14 % (ref 13–44)
MCH RBC QN AUTO: 31.7 PG (ref 26.1–32.9)
MCHC RBC AUTO-ENTMCNC: 33.5 G/DL (ref 31.4–35)
MCV RBC AUTO: 94.6 FL (ref 79.6–97.8)
MONOCYTES # BLD: 1.2 K/UL (ref 0.1–1.3)
MONOCYTES NFR BLD: 8 % (ref 4–12)
NEUTS SEG # BLD: 11.1 K/UL (ref 1.7–8.2)
NEUTS SEG NFR BLD: 76 % (ref 43–78)
NRBC # BLD: 0 K/UL (ref 0–0.2)
PLATELET # BLD AUTO: 254 K/UL (ref 150–450)
PMV BLD AUTO: 8.9 FL (ref 9.4–12.3)
POTASSIUM SERPL-SCNC: 3.6 MMOL/L (ref 3.5–5.1)
RBC # BLD AUTO: 3.72 M/UL (ref 4.05–5.2)
SERVICE CMNT-IMP: ABNORMAL
SODIUM SERPL-SCNC: 141 MMOL/L (ref 136–145)
WBC # BLD AUTO: 14.6 K/UL (ref 4.3–11.1)

## 2021-04-08 PROCEDURE — 74011250636 HC RX REV CODE- 250/636: Performed by: STUDENT IN AN ORGANIZED HEALTH CARE EDUCATION/TRAINING PROGRAM

## 2021-04-08 PROCEDURE — 97166 OT EVAL MOD COMPLEX 45 MIN: CPT

## 2021-04-08 PROCEDURE — 74011250637 HC RX REV CODE- 250/637: Performed by: STUDENT IN AN ORGANIZED HEALTH CARE EDUCATION/TRAINING PROGRAM

## 2021-04-08 PROCEDURE — 2709999900 HC NON-CHARGEABLE SUPPLY

## 2021-04-08 PROCEDURE — 65270000029 HC RM PRIVATE

## 2021-04-08 PROCEDURE — 36415 COLL VENOUS BLD VENIPUNCTURE: CPT

## 2021-04-08 PROCEDURE — 97165 OT EVAL LOW COMPLEX 30 MIN: CPT

## 2021-04-08 PROCEDURE — 97161 PT EVAL LOW COMPLEX 20 MIN: CPT

## 2021-04-08 PROCEDURE — 74011000258 HC RX REV CODE- 258: Performed by: STUDENT IN AN ORGANIZED HEALTH CARE EDUCATION/TRAINING PROGRAM

## 2021-04-08 PROCEDURE — 97530 THERAPEUTIC ACTIVITIES: CPT

## 2021-04-08 PROCEDURE — 85025 COMPLETE CBC W/AUTO DIFF WBC: CPT

## 2021-04-08 PROCEDURE — 97112 NEUROMUSCULAR REEDUCATION: CPT

## 2021-04-08 PROCEDURE — 82962 GLUCOSE BLOOD TEST: CPT

## 2021-04-08 PROCEDURE — 80048 BASIC METABOLIC PNL TOTAL CA: CPT

## 2021-04-08 PROCEDURE — 92526 ORAL FUNCTION THERAPY: CPT

## 2021-04-08 RX ADMIN — PROPRANOLOL HYDROCHLORIDE 20 MG: 10 TABLET ORAL at 09:44

## 2021-04-08 RX ADMIN — SODIUM CHLORIDE 75 ML/HR: 900 INJECTION, SOLUTION INTRAVENOUS at 00:59

## 2021-04-08 RX ADMIN — MEMANTINE HYDROCHLORIDE 10 MG: 5 TABLET ORAL at 18:26

## 2021-04-08 RX ADMIN — METOPROLOL SUCCINATE 50 MG: 50 TABLET, EXTENDED RELEASE ORAL at 09:44

## 2021-04-08 RX ADMIN — LEVETIRACETAM 500 MG: 100 INJECTION, SOLUTION INTRAVENOUS at 00:57

## 2021-04-08 RX ADMIN — FAMOTIDINE 20 MG: 20 TABLET ORAL at 18:26

## 2021-04-08 RX ADMIN — LORAZEPAM 0.5 MG: 0.5 TABLET ORAL at 18:00

## 2021-04-08 RX ADMIN — FAMOTIDINE 20 MG: 20 TABLET ORAL at 09:45

## 2021-04-08 RX ADMIN — AMLODIPINE BESYLATE 5 MG: 5 TABLET ORAL at 09:45

## 2021-04-08 RX ADMIN — Medication 10 ML: at 14:00

## 2021-04-08 RX ADMIN — LEVETIRACETAM 500 MG: 100 INJECTION, SOLUTION INTRAVENOUS at 12:12

## 2021-04-08 RX ADMIN — SODIUM CHLORIDE 75 ML/HR: 900 INJECTION, SOLUTION INTRAVENOUS at 21:36

## 2021-04-08 RX ADMIN — Medication 10 ML: at 21:32

## 2021-04-08 RX ADMIN — Medication 10 ML: at 05:12

## 2021-04-08 RX ADMIN — CEFTRIAXONE SODIUM 1 G: 1 INJECTION, POWDER, FOR SOLUTION INTRAMUSCULAR; INTRAVENOUS at 05:11

## 2021-04-08 RX ADMIN — MEMANTINE HYDROCHLORIDE 10 MG: 5 TABLET ORAL at 09:44

## 2021-04-08 RX ADMIN — PROPRANOLOL HYDROCHLORIDE 20 MG: 10 TABLET ORAL at 18:26

## 2021-04-08 RX ADMIN — DONEPEZIL HYDROCHLORIDE 10 MG: 5 TABLET, FILM COATED ORAL at 21:26

## 2021-04-08 RX ADMIN — ENOXAPARIN SODIUM 40 MG: 40 INJECTION SUBCUTANEOUS at 09:44

## 2021-04-08 RX ADMIN — ESCITALOPRAM OXALATE 10 MG: 10 TABLET ORAL at 18:26

## 2021-04-08 NOTE — PROGRESS NOTES
LTG: Patient will tolerate least restrictive diet without overt signs or symptoms of airway compromise. MET 4/8  STG: Patient will tolerate po trials without overt signs or symptoms of airway compromise in efforts to identify safest oral diet. MET 4/8  STG: Patient will participate in modified barium swallow study as clinically indicated. Discontinued 4/8  SPEECH LANGUAGE PATHOLOGY: DYSPHAGIA- Daily Note and Discharge    NAME/AGE/GENDER: Kathleen Vazquez is a 64 y.o. female  DATE: 4/8/2021  PRIMARY DIAGNOSIS: Seizure ([facility] [address] [R56.9]      ICD-10: Treatment Diagnosis: R13.12 Dysphagia, Oropharyngeal Phase    RECOMMENDATIONS   DIET: 1:1 assistance   Regular Consistency   Thin Liquids    MEDICATIONS: Whole in puree or per patient preference      ASPIRATION PRECAUTIONS  · Slow rate of intake  · Small bites/sips  · Upright at 90 degrees during meal     COMPENSATORY STRATEGIES/MODIFICATIONS  · 1:1 assistance     RECOMMENDATIONS for CONTINUED SPEECH THERAPY:   No further speech therapy indicated at this time. ASSESSMENT   Patient presents inconsistent oral dysphagia ranging from impaired oral acceptance to prompt oral acceptance, manipulation, and transit. No overt s/sx pharyngeal dysphagia. Recommend advance to regular diet and thin liquids. Encouragement as needed. No skilled dysphagia treatment indicated. EDUCATION:  · Recommendations discussed with Patient and RN  CONTINUATION OF SKILLED SERVICES/MEDICAL NECESSITY:   No additional speech services warranted. REHABILITATION POTENTIAL FOR STATED GOALS: Good    PLAN    FREQUENCY/DURATION: No further speech therapy indicated at this time as oropharyngeal swallow function is within normal limits. SUBJECTIVE   Positioned upright. alert. inconsistent responses to command following and yes/no questions. At times, promptly response and other times \"I don't know\" or no response.      Oxygen Device: NC  Pain: Pain Scale 1: Numeric (0 - 10)  Pain Intensity 1: 0    History of Present Injury/Illness: Ms. Holly Pina  has a past medical history of Alzheimer's dementia ([address] Hypertension, and Menopause. . She also  has a past surgical history that includes hx breast reduction (Bilateral, 1982). PRECAUTIONS/ALLERGIES: Patient has no known allergies. Problem List:  (Impairments causing functional limitations):  1. Oropharyngeal dysphagia   Per chart, MRI negative for acute CVA  Diet Prior to Evaluation: NPO except meds    Orientation:  name in yes/no format    OBJECTIVE   Oral Motor:   · COMMENTS: adequate dentition     Dysphagia treatment(po trials)  1:1 assistance. Patient consumed ice chips, thin via cup and straw, puree, mixed, and solids. Able to accept via cup this date appropriately. Encouragement to drink via straw then no further difficulty observed. Adequate acceptance/clearance with puree. Holding cracker between lips until encouraged to chew/swallow. Patient then with timely mastication and clearance. Adequate acceptance, prep/clearance with fruit. No overt s/sx with serial sips via straw and vocal quality clear.       Tool Used: Dysphagia Outcome and Severity Scale (CARLA)    Score Comments   Normal Diet  [] 7 With no strategies or extra time needed   Functional Swallow  [] 6 May have mild oral or pharyngeal delay   Mild Dysphagia  [] 5 Which may require one diet consistency restricted    Mild-Moderate Dysphagia  [] 4 With 1-2 diet consistencies restricted   Moderate Dysphagia  [] 3 With 2 or more diet consistencies restricted   Moderate-Severe Dysphagia  [] 2 With partial PO strategies (trials with ST only)   Severe Dysphagia  [] 1 With inability to tolerate any PO safely      Score:  Initial: 2 Most Recent: 6 (Date 04/08/21 )   Interpretation of Tool: The Dysphagia Outcome and Severity Scale (CARLA) is a simple, easy-to-use, 7-point scale developed to systematically rate the functional severity of dysphagia based on objective assessment and make recommendations for diet level, independence level, and type of nutrition.      INTERDISCIPLINARY COLLABORATION: RN    After treatment position/precautions:  · Upright in bed  · RN notified     Total Treatment Duration:   Time In: 0001  Time Out: [address], [facility], [employer] QUIROZ, CCC-SLP

## 2021-04-08 NOTE — PROGRESS NOTES
[facility] Hospitalist Progress Note     Name:  Carey Castro  Age:60 y.o. Sex:female   :  1960    MRN:  [id number]     Admit Date:  2021    Reason for Admission:  Seizure [facility]) [R56.9]    Hospital Course/Interval history:     Carey Castro is a 64 y.o. female with medical history of early onset Alzheimer's dementia, hypertension, chronic benzo use, who presented to ED with seizure. Patient altered and likely postictal my examination the ED, Gilberto history obtained from  at bedside.  states that he put patient to bed last night in her normal state of health without any acute issues. She woke up in walk downstairs, which is not unusual for her with her underlying dementia.  stayed in bed with her for some time after, and subsequently found her to be convulsing, with whole body shaking. He does not know if she lost any urine or stool, but thinks that she may have had some urinary incontinence. No evidence of tongue biting. Of note, patient is on Ativan 1 mg 3 times daily for her anxiety and agitation. Yesterday  noticed that she had ran out and she did not receive any Ativan yesterday.  denies any complaints at home, although patient mostly confused at baseline and does not make much sense with her words. Denies any fevers, chills, pain, dysuria, urinary issues, diarrhea, cough or congestion. He denies any previous personal or family history of seizure disorder. In the ED, patient again had seizure activity with whole body convulsing. Her seizures aborted with Ativan administration. She was post-ictal in the ED, confused and not making any sense with her words. Labs positive for mild UTI and IGNACIO.        Assessment & Plan     #Seizures  - s/p Ativan IV in ED for seizure activity  - likely from benzo withdrawal, UTI but may be at risk with underlying dementia  - cont on Keppra IV BID  - will cont with ativan 0.5mg BID at lower dose (takes 1mg TID) to prevent possible withdrawal seizures  - brain MRI negative for infarct or other abnormality  - Awaiting EEG results  - ativan IV prn for seizure activity  - consulted neurology for assistance, follows Dr. Eden Childress with neurology in outpatient setting    #Acute Hypoxic Respiratory Failure  - now on 2L NC  - CXR clear on admission, likely atelectasis  - start incentive spirometry      #UTI  - UA mildly positive  - will cover with rocephin  - follow up UCx     #IGNACIO  - Cr 1.09 on admission  - improved to 0.74 on IVF, cont with IVF for now  - recheck in AM     #Alzheimers Dementia  - cont home namenda and aricept  - cont seroquel 25mg at bedtime  - cont home lexapro     #HTN  - cont home toprol and propanolol      Diet:  DIET REGULAR  DVT PPx: hold Lovenox with severe bruising  Code status: Full Code  Disposition/Expected LOS: 1-2 midnight hospital stay for seiuzre workup. PT/OT/ST consulted  Surrogate decision-maker:  at bedside, Ulices Childress, [phone]        Subjective (04/08/21):    Seen and examined at bedside this morning. No acute events overnight.  at bedside states that she did not have any seizure-like activity. Deafly become more alert and awake on my examination this morning compared to yesterday and her post ictal state.  states that she is not fully back to her baseline yet. Denies any cough or congestion overnight, fevers or chills. Review of Systems: 14 point review of systems is otherwise negative with the exception of the elements mentioned above.     Objective:     Patient Vitals for the past 24 hrs:   Temp Pulse Resp BP SpO2   04/08/21 1124 98.8 °F (37.1 °C) 71 19 124/84 92 %   04/08/21 1107     96 %   04/08/21 0752 98.7 °F (37.1 °C) 79 19 131/85 94 %   04/08/21 0350 99.5 °F (37.5 °C) 78 20 (!) 144/88 92 %   04/07/21 2352 99.7 °F (37.6 °C) 79 20 135/88 95 %   04/07/21 1913 99.4 °F (37.4 °C) 85 20 (!) 140/95 96 %     Oxygen Therapy  O2 Sat (%): 92 % (04/08/21 1124)  Pulse via Oximetry: 68 beats per minute (04/08/21 1107)  O2 Device: Nasal cannula (04/08/21 1107)  O2 Flow Rate (L/min): 2 l/min (04/08/21 1107)    Body mass index is 28.19 kg/m². Physical Exam:   General:  No acute distress, speaking in full sentences, no use of accessory muscles   Lungs:  Clear to auscultation bilaterally   CV:  Regular rate and rhythm with normal S1 and S2   Abdomen:  Soft, nontender, nondistended, normoactive bowel sounds   Extremities:  No cyanosis clubbing or edema   Neuro:  Some generalized weakness  Psych:  Normal affect, pleasantly confused    Data Review:  I have reviewed all labs, meds, and studies from the last 24 hours:    Labs:    Recent Results (from the past 24 hour(s))   METABOLIC PANEL, BASIC    Collection Time: 04/08/21  4:57 AM   Result Value Ref Range    Sodium 141 136 - 145 mmol/L    Potassium 3.6 3.5 - 5.1 mmol/L    Chloride 112 (H) 98 - 107 mmol/L    CO2 22 21 - 32 mmol/L    Anion gap 7 7 - 16 mmol/L    Glucose 124 (H) 65 - 100 mg/dL    BUN 17 6 - 23 MG/DL    Creatinine 0.74 0.6 - 1.0 MG/DL    GFR est AA >60 >60 ml/min/1.73m2    GFR est non-AA >60 >60 ml/min/1.73m2    Calcium 8.5 8.3 - 10.4 MG/DL   CBC WITH AUTOMATED DIFF    Collection Time: 04/08/21  4:57 AM   Result Value Ref Range    WBC 14.6 (H) 4.3 - 11.1 K/uL    RBC 3.72 (L) 4.05 - 5.2 M/uL    HGB 11.8 11.7 - 15.4 g/dL    HCT 35.2 (L) 35.8 - 46.3 %    MCV 94.6 79.6 - 97.8 FL    MCH 31.7 26.1 - 32.9 PG    MCHC 33.5 31.4 - 35.0 g/dL    RDW 12.9 11.9 - 14.6 %    PLATELET 838 092 - 885 K/uL    MPV 8.9 (L) 9.4 - 12.3 FL    ABSOLUTE NRBC 0.00 0.0 - 0.2 K/uL    DF AUTOMATED      NEUTROPHILS 76 43 - 78 %    LYMPHOCYTES 14 13 - 44 %    MONOCYTES 8 4.0 - 12.0 %    EOSINOPHILS 1 0.5 - 7.8 %    BASOPHILS 0 0.0 - 2.0 %    IMMATURE GRANULOCYTES 1 0.0 - 5.0 %    ABS. NEUTROPHILS 11.1 (H) 1.7 - 8.2 K/UL    ABS. LYMPHOCYTES 2.0 0.5 - 4.6 K/UL    ABS. MONOCYTES 1.2 0.1 - 1.3 K/UL    ABS. EOSINOPHILS 0.1 0.0 - 0.8 K/UL    ABS. BASOPHILS 0.0 0.0 - 0.2 K/UL    ABS. IMM. GRANS. 0.1 0.0 - 0.5 K/UL   GLUCOSE, POC    Collection Time: 04/08/21 11:12 AM   Result Value Ref Range    Glucose (POC) 119 (H) 65 - 100 mg/dL    Performed by [facility]        All Micro Results     Procedure Component Value Units Date/Time    CULTURE, URINE [[id number]] Collected: 04/07/21 1200    Order Status: Completed Specimen: Urine from Clean catch Updated: 04/08/21 0726     Special Requests: NO SPECIAL REQUESTS        Culture result:       NO GROWTH AFTER SHORT PERIOD OF INCUBATION. FURTHER RESULTS TO FOLLOW AFTER OVERNIGHT INCUBATION. EKG Results     Procedure 720 Value Units Date/Time    EKG, 12 LEAD, INITIAL [[id number]] Collected: 04/07/21 0054    Order Status: Completed Updated: 04/07/21 1158     Ventricular Rate 64 BPM      Atrial Rate 64 BPM      P-R Interval 172 ms      QRS Duration 98 ms      Q-T Interval 428 ms      QTC Calculation (Bezet) 441 ms      Calculated P Axis 38 degrees      Calculated R Axis 67 degrees      Calculated T Axis 83 degrees      Diagnosis --     !! AGE AND GENDER SPECIFIC ECG ANALYSIS !! Normal sinus rhythm  Nonspecific T wave abnormality  Abnormal ECG  When compared with ECG of 13-SEP-2000 06:57,  Vent. rate has decreased BY  42 BPM    Confirmed by ST RAIMUNDO ADKINS MD (), FRANCES ELIZABETH ([id number]) on 4/7/2021 11:58:36 AM            Other Studies:  Mri Brain W Wo Cont    Result Date: 4/7/2021  EXAMINATION: BRAIN MRI 4/7/2021 4:20 PM INDICATION: Seizure COMPARISON: CT head April 7, 2021 TECHNIQUE: Multiplanar multisequence MRI of the brain without and with intravenous administration of 17 cc Prohance contrast agent. FINDINGS: Image quality is degraded due to repetitive patient motion, but remains diagnostic utility. . Dedicated slices through the mesial temporal lobes as well as thin postcontrast imaging were not able to be performed. No evidence of acute or recent infarct. No evidence of recent hemorrhage.  Normal cerebral parenchyma signal for age. Normal vascular flow voids. Normal size of ventricles and extra-axial spaces for age. No abnormal contrast enhancement. No calvarial abnormality is seen. Grossly normal orbital structures. Essentially clear paranasal sinuses and mastoid air cells. No abnormality of extracranial soft tissues. Age-appropriate MR brain without acute abnormality.        Current Meds:   Current Facility-Administered Medications   Medication Dose Route Frequency    [START ON 4/9/2021] cefTRIAXone (ROCEPHIN) 1 g in 0.9% sodium chloride (MBP/ADV) 50 mL MBP  1 g IntraVENous Q24H    amLODIPine (NORVASC) tablet 5 mg  5 mg Oral DAILY    donepeziL (ARICEPT) tablet 10 mg  10 mg Oral DAILY    escitalopram oxalate (LEXAPRO) tablet 10 mg  10 mg Oral QPM    memantine (NAMENDA) tablet 10 mg  10 mg Oral BID    sodium chloride (NS) flush 5-40 mL  5-40 mL IntraVENous Q8H    sodium chloride (NS) flush 5-40 mL  5-40 mL IntraVENous PRN    acetaminophen (TYLENOL) tablet 650 mg  650 mg Oral Q6H PRN    Or    acetaminophen (TYLENOL) suppository 650 mg  650 mg Rectal Q6H PRN    polyethylene glycol (MIRALAX) packet 17 g  17 g Oral DAILY PRN    bisacodyL (DULCOLAX) suppository 10 mg  10 mg Rectal DAILY PRN    promethazine (PHENERGAN) tablet 25 mg  25 mg Oral Q6H PRN    Or    ondansetron (ZOFRAN) injection 4 mg  4 mg IntraVENous Q6H PRN    famotidine (PEPCID) tablet 20 mg  20 mg Oral BID PRN    levETIRAcetam (KEPPRA) 500 mg in 0.9% sodium chloride (MBP/ADV) 100 mL MBP  500 mg IntraVENous Q12H    0.9% sodium chloride infusion  75 mL/hr IntraVENous CONTINUOUS    [Held by provider] LORazepam (ATIVAN) tablet 0.5 mg  0.5 mg Oral BID    metoprolol succinate (TOPROL-XL) XL tablet 50 mg  50 mg Oral DAILY    propranoloL (INDERAL) tablet 20 mg  20 mg Oral BID    LORazepam (ATIVAN) injection 2 mg  2 mg IntraVENous Q2H PRN    famotidine (PEPCID) tablet 20 mg  20 mg Oral BID       Problem List:  Hospital Problems as of 4/8/2021 Date Reviewed: 4/7/2021          Codes Class Noted - Resolved POA    Seizure ([facility] ICD-10-CM: R56.9  ICD-9-CM: 780.39  4/7/2021 - Present Unknown        Early onset Alzheimer's dementia without behavioral disturbance ([facility] ICD-10-CM: G30.0, F02.80  ICD-9-CM: 331.0, 294.10  6/8/2017 - Present Yes        Benign essential HTN ICD-10-CM: I10  ICD-9-CM: 401.1  11/18/2015 - Present Yes               Part of this note was written by using a voice dictation software and the note has been proof read but may still contain some grammatical/other typographical errors.     Signed By: Jeremy Almaraz MD   [employer] [facility]    April 8, 2021  5:15 PM

## 2021-04-08 NOTE — PROGRESS NOTES
Problem: Falls - Risk of  Goal: *Absence of Falls  Description: Document Franklin Bhatti Fall Risk and appropriate interventions in the flowsheet. Outcome: Progressing Towards Goal  Note: Fall Risk Interventions:  Mobility Interventions: Bed/chair exit alarm, OT consult for ADLs, PT Consult for mobility concerns, Patient to call before getting OOB    Mentation Interventions: Adequate sleep, hydration, pain control, Bed/chair exit alarm    Medication Interventions: Assess postural VS orthostatic hypotension, Bed/chair exit alarm, Evaluate medications/consider consulting pharmacy, Patient to call before getting OOB    Elimination Interventions: Bed/chair exit alarm, Call light in reach, Elevated toilet seat, Patient to call for help with toileting needs, Stay With Me (per policy), Toilet paper/wipes in reach, Toileting schedule/hourly rounds              Problem: Patient Education: Go to Patient Education Activity  Goal: Patient/Family Education  Outcome: Progressing Towards Goal     Problem: Pressure Injury - Risk of  Goal: *Prevention of pressure injury  Description: Document Ashwin Scale and appropriate interventions in the flowsheet. Outcome: Progressing Towards Goal  Note: Pressure Injury Interventions:  Sensory Interventions: Assess changes in LOC    Moisture Interventions: Offer toileting Q_hr, Check for incontinence Q2 hours and as needed    Activity Interventions: Pressure redistribution bed/mattress(bed type), Increase time out of bed, PT/OT evaluation    Mobility Interventions: Pressure redistribution bed/mattress (bed type), PT/OT evaluation, Turn and reposition approx.  every two hours(pillow and wedges)    Nutrition Interventions: Document food/fluid/supplement intake                     Problem: Patient Education: Go to Patient Education Activity  Goal: Patient/Family Education  Outcome: Progressing Towards Goal

## 2021-04-08 NOTE — PROGRESS NOTES
ACUTE PHYSICAL THERAPY GOALS:  (Developed with and agreed upon by patient and/or caregiver.)    1. Patient will perform bed mobility with SUPERVISION within 7 days. 2. Patient will transfer bed to chair with SUPERVISION within 7 days. 3. Patient will demonstrate FAIR+ DYNAMIC STANDING balance within 7 day(s). 4. Patient will ambulate 150ft+ with STAND BY ASSISTANCE within 7 days. 5. Patient will tolerate 25+ minutes of therapeutic activity/exercise and/or neuromuscular re-education while maintaining stable vitals to improve functional strength and activity tolerance within 7 days. PHYSICAL THERAPY ASSESSMENT: Initial Assessment, Daily Note and AM PT Treatment Day # 1      Xiomy Be is a 64 y.o. female   PRIMARY DIAGNOSIS: <principal problem not specified>  Seizure ([facility] [R56.9]       Reason for Referral:    ICD-10: Treatment Diagnosis: Generalized Muscle Weakness (M62.81)  Difficulty in walking, Not elsewhere classified (R26.2)  INPATIENT: Payor: BLUE CROSS MEDICARE / Plan[place]PO / Product Type: Managed Care Medicare /     ASSESSMENT:     REHAB RECOMMENDATIONS:   Recommendation to date pending progress:  [address] [place] [address]  Equipment:    To Be Determined     PRIOR LEVEL OF FUNCTION:  (Prior to Hospitalization) INITIAL/CURRENT LEVEL OF FUNCTION:  (Most Recently Demonstrated)   Bed Mobility:   Supervision  Sit to Stand:   Supervision  Transfers:   Supervision  Gait/Mobility:   Supervision Bed Mobility:   Moderate Assistance x 2  Sit to Stand:   Minimal Assistance x 2  Transfers:   Minimal Assistance x 2  Gait/Mobility:   Minimal Assistance x 2     ASSESSMENT:  Ms. Shanika Null is a 64year old female admitted with acute onset seizure. Patient is seen this AM for initial PT evaluation. Of note, patient with early onset dementia; Oriented x1-2 at baseline; oriented x1 and drowsy today; spouse at bedside provides history. At baseline, Ms. Shnaika Null ambulates household level distances without utilizing an assistive device and requires assistance from spouse and in home caregiver for all ADLs; able to self feed with set up and cueing. Patient requires cues throughout mobility as well as supervision secondary to cognitive status. Today, presents with generalized weakness in B LEs, R LE tone, decreased AROM in proximal B UEs, and decreased balance/gait status from baseline. Required moderate assistance x2 for bed mobility and minimal assistance x2 for transfers and pre-gait weight shifts with gait belt. Patient is currently functioning below her baseline with regards to transfers and ambulation. Anticipate once drowsiness improves, mobility will improve as well. Recommend HHPT at discharge. Will follow with stated plan of care. Of note, bruising appreciated to patient's proximal B UEs. Patient c/o pain with AROM and PROM of proximal B UEs. Does not appear to be in pain at rest.       SUBJECTIVE:   Ms. Jennie Wick states, \"Diana. \"    SOCIAL HISTORY/LIVING ENVIRONMENT:   Of note, patient with early onset dementia; Oriented x1-2 at baseline; oriented x1 and drowsy today;   spouse at bedside provides history. At baseline, Ms. Jennie Wick ambulates household level distances   without utilizing an assistive device and requires assistance from spouse and in home caregiver for   all ADLs; able to self feed with set up and cueing. Home Environment: Private residence  One/Two Story Residence: Two story  Living Alone: No  Support Systems: Family member(s), Spouse/Significant Other/Partner  OBJECTIVE:     PAIN: VITAL SIGNS: LINES/DRAINS:   Pre Treatment: Pain Screen  Pain Scale 1: FLACC  Pain Intensity 1: 4  Pain Onset 1: mobility  Pain Location 1: Shoulder;Arm  Pain Orientation 1: Right;Left;Proximal  Pain Description 1: Sore  Pain Intervention(s) 1: Emotional support;Position; Rest  Post Treatment: FLACC 0 at rest     IV  O2 Device: Nasal cannula     GROSS EVALUATION:  B LE Within Functional Limits Abnormal/ Functional Abnormal/ Non-Functional (see comments) Not Tested Comments:   AROM [] [x] [] [] Proximally   PROM [x] [] [] []    Strength [] [x] [] [] Generalized, non-focal   Balance [] [x] [] [] Fair in sitting poor in standing   Posture [] [x] [] [] Mild forward head   Sensation [x] [] [] [] Intact light touch and noxious B LE per patient   Coordination [] [x] [] [] Decreased stepping coordination    Tone [] [x] [] [] R LE   Edema [] [] [] []    Activity Tolerance [] [x] [] [] Drowsy    [] [] [] []      COGNITION/  PERCEPTION: Intact Impaired   (see comments) Comments:   Orientation [] [x] Oriented x1 (Oriented x1-2 at baseline)   Vision [] [] Attends to all visual/spatial fields   Hearing [] [] Not formally assessed   Command Following [] [x]    Safety Awareness [] [x]     [] []      MOBILITY: I Mod I S SBA CGA Min Mod Max Total  NT x2 Comments:   Bed Mobility    Rolling [] [] [] [] [] [] [] [] [] [x] []    Supine to Sit [] [] [] [] [] [] [x] [] [] [] [x]    Scooting [] [] [] [] [] [] [x] [] [] [] [x]    Sit to Supine [] [] [] [] [] [] [x] [] [] [] [x]    Transfers    Sit to Stand [] [] [] [] [] [x] [] [] [] [] [x]    Bed to Chair [] [] [] [] [] [] [] [] [] [x] []    Stand to Sit [] [] [] [] [] [x] [] [] [] [] [x]    I=Independent, Mod I=Modified Independent, S=Supervision, SBA=Standby Assistance, CGA=Contact Guard Assistance,   Min=Minimal Assistance, Mod=Moderate Assistance, Max=Maximal Assistance, Total=Total Assistance, NT=Not Tested  GAIT: I Mod I S SBA CGA Min Mod Max Total  NT x2 Comments:   Level of Assistance [] [] [] [] [] [x] [] [] [] [] [x]    Distance 2ft lateral stepping with manual weight shifts    DME Gait Belt    Gait Quality Decreased step initiation, decreased step clearance, B LE functional weakness    Weightbearing Status N/A     I=Independent, Mod I=Modified Independent, S=Supervision, SBA=Standby Assistance, CGA=Contact Guard Assistance,   Min=Minimal Assistance, Mod=Moderate Assistance, Max=Maximal Assistance, Total=Total Assistance, NT=Not Tested    [address] [place] [address] AM-PAC [facility] Form       How much difficulty does the patient currently have. .. Unable A Lot A Little None   1. Turning over in bed (including adjusting bedclothes, sheets and blankets)? [] 1   [x] 2   [] 3   [] 4   2. Sitting down on and standing up from a chair with arms ( e.g., wheelchair, bedside commode, etc.)   [] 1   [] 2   [x] 3   [] 4   3. Moving from lying on back to sitting on the side of the bed? [] 1   [] 2   [x] 3   [] 4   How much help from another person does the patient currently need. .. Total A Lot A Little None   4. Moving to and from a bed to a chair (including a wheelchair)? [] 1   [x] 2   [] 3   [] 4   5. Need to walk in hospital room? [] 1   [x] 2   [] 3   [] 4   6. Climbing 3-5 steps with a railing? [] 1   [x] 2   [] 3   [] 4   © 2007, Trustees of [address] [place] [id number], under license to [employer]. All rights reserved     Score:  Initial: 14 Most Recent: X (Date: -- )    Interpretation of Tool:  Represents activities that are increasingly more difficult (i.e. Bed mobility, Transfers, Gait). PLAN:   FREQUENCY/DURATION: PT Plan of Care: 3 times/week for duration of hospital stay or until stated goals are met, whichever comes first.    PROBLEM LIST:   (Skilled intervention is medically necessary to address:)  1. Decreased ADL/Functional Activities  2. Decreased Activity Tolerance  3. Decreased Cognition  4. Decreased Coordination  5. Decreased Gait Ability  6. Decreased Strength  7. Decreased Transfer Abilities  8. Increased Pain   INTERVENTIONS PLANNED:   (Benefits and precautions of physical therapy have been discussed with the patient.)  1. Therapeutic Activity  2. Therapeutic Exercise/HEP  3. Neuromuscular Re-education  4. Gait Training  5. Manual Therapy  6.  Education     TREATMENT:     EVALUATION: Low Complexity : (Untimed Charge)    TREATMENT:   ($$ Therapeutic Activity: 23-37 mins    )  Therapeutic Activity (23 Minutes): Therapeutic activity included Supine to Sit, Sit to Supine, Transfer Training and Ambulation on level ground to improve functional Mobility, Strength and Activity tolerance. At this time, patient is appropriate for Co-treatment with occupational therapy due to patient's decreased overall endurance/tolerance levels, as well as need for high level skilled assistance to complete functional transfers/mobility and functional tasks. Flaca Herrera is appropriate for a multidisciplinary co-treatment of PT and OT to address goals of both disciplines.        TREATMENT GRID:  N/A    AFTER TREATMENT POSITION/PRECAUTIONS:  Alarm Activated, Bed, Needs within reach, RN notified, Visitors at bedside and Spouse attending at bedside    INTERDISCIPLINARY COLLABORATION:  RN/PCT, PT/PTA and OT/RUEDA    TOTAL TREATMENT DURATION:  PT Patient Time In/Time Out  Time In: 1014  Time Out: KAPIL Chang

## 2021-04-08 NOTE — PROGRESS NOTES
ACUTE OT GOALS:  (Developed with and agreed upon by patient and/or caregiver.)  1. Patient will complete functional transfers in prep for ADLs with supervision. 2. Patient will complete rolling in bed to assist with ADLs with supervision. 3. Patient will feed self entire meal with minimal assistance to increase independence with ADLs. Timeframe: 7 visits    OCCUPATIONAL THERAPY ASSESSMENT: Initial Assessment and Daily Note OT Treatment Day # 1    Leroy Kebede is a 64 y.o. female   PRIMARY DIAGNOSIS: <principal problem not specified>  Seizure ([facility] [address] [R56.9]       Reason for Referral:    ICD-10: Treatment Diagnosis: Generalized Muscle Weakness (M62.81)  Other lack of cordination (R27.8)  Difficulty in walking, Not elsewhere classified (R26.2)  INPATIENT: Payor: BLUE CROSS MEDICARE / Plan[place] / Product Type: Managed Care Medicare /   ASSESSMENT:     REHAB RECOMMENDATIONS:   Recommendation to date pending progress:  [address] [place] [address]  Equipment:    Potentially Shower Chair for safety      PRIOR LEVEL OF FUNCTION:  (Prior to Hospitalization)  INITIAL/CURRENT LEVEL OF FUNCTION:  (Based on today's evaluation)   Bathing:   Maximal Assistance  Dressing:   Maximal Assistance  Feeding/Grooming:   Set up - Minimal Assistance with set up and cueing and some assistance  Toileting:   Maximal Assistance  Functional Mobility:   Supervision Bathing:   Maximal Assistance  Dressing:   Maximal Assistance  Feeding/Grooming:   Maximal Assistance  Toileting:   Maximal Assistance  Functional Mobility:   Minimal Assitance- Moderate Assistance x2     ASSESSMENT:  Ms. Abilio Lowery is a 65 y/o female presents with seizures. Pt lives with  in a 2-level home with a walk-in shower w/o AD. Pt has Alzheimer's/dementia at baseline and  present at [facility] to obtain history. Pt AAO x1 today and drowsy.  Pt requires max A for ADLs with  and caregiver at baseline and is able to self feed with set up-min A and cueing from . Pt demonstrated pain with PROM of BUE this eval, which per  is not normal. Pt has significant bruising on inside of BUE at the proximal shoulder, RN notified. Pt is close to baseline with ADLs, but is currently limited with transfers and mobility today, requiring min-mod A x2 for bed mobility and sit<>stand. Pt tolerated sitting EOB for ~7 minutes before lying down. Pt required max verbal cues for sequencing and initiating sit>supine secondary to confusion and dementia. Pt on 4L O2 NC and does not wear O2 at home. Rec HHOT at d/c to assist with functional transfers in prep for ADLs. SUBJECTIVE:   Ms. Balwinder Becerra states, \"Edna. \"    SOCIAL HISTORY/LIVING ENVIRONMENT: Pt lives with  in a 2-level home with a walk-in shower w/o AD. Pt has Alzheimer's/dementia at baseline and  present at [facility] to obtain history. Pt AAO x1 today and drowsy. Pt requires max A for ADLs with  and caregiver at baseline and is able to self feed with set up-min A and cueing from .   Home Environment: Private residence  One/Two Story Residence: Two story  Living Alone: No  Support Systems: Family member(s), Spouse/Significant Other/Partner    OBJECTIVE:     PAIN: VITAL SIGNS: LINES/DRAINS:   Pre Treatment: Pain Screen  Pain Scale 1: FLACC  Pain Intensity 1: 3  Pain Onset 1: mobilizing  Pain Location 1: Shoulder  Pain Orientation 1: Right;Proximal;Left  Pain Description 1: Sore  Pain Intervention(s) 1: Emotional support;Nurse notified  Post Treatment: pt relaxed and in no complaint of pain   IV and Purewick  O2 Device: Nasal cannula     GROSS EVALUATION:  BUE Within Functional Limits Abnormal/ Functional Abnormal/ Non-Functional (see comments) Not Tested Comments:   AROM [] [] [x] []    PROM [] [] [x] [] Pt would not allow for PROM due to increased pain in BUE   Strength [] [] [x] [] 5/5  strength, would not resist when attempt to PROM BUE   Balance [] [x] [] [] SBA EOB, posterior lean   Posture [] [x] [] []    Sensation [] [] [] [x] Unable to be formally tested   Coordination [] [x] [] []    Tone [x] [] [] []    Edema [] [x] [] [] BUE swollen proximal shoulder   Activity Tolerance [] [] [x] [] 4L O2 NC, minimal tolerance to standing HHA x2    [] [] [] []      COGNITION/  PERCEPTION: Intact Impaired   (see comments) Comments:   Orientation [] [x] AAO x1, AAO x2 at baseline per    Vision [x] []    Hearing [x] []    Judgment/ Insight [] [x]    Attention [] [x]    Memory [] [x] Secondary to dementia   Command Following [] [x] Required cues secondary to dementia, drowsiness and confusion today   Emotional Regulation [] [x]     [] []      ACTIVITIES OF DAILY LIVING: I Mod I S SBA CGA Min Mod Max Total NT Comments   BASIC ADLs:              Bathing/ Showering [] [] [] [] [] [] [] [] [] []    Toileting [] [] [] [] [] [] [] [] [] []    Dressing [] [] [] [] [] [] [] [] [] []    Feeding [] [] [] [] [] [] [] [] [] []    Grooming [] [] [] [] [] [] [] [] [] []    Personal Device Care [] [] [] [] [] [] [] [] [] []    Functional Mobility [] [] [] [] [] [x] [x] [] [] [] HHA x2   I=Independent, Mod I=Modified Independent, S=Supervision, SBA=Standby Assistance, CGA=Contact Guard Assistance,   Min=Minimal Assistance, Mod=Moderate Assistance, Max=Maximal Assistance, Total=Total Assistance, NT=Not Tested    MOBILITY: I Mod I S SBA CGA Min Mod Max Total  NT x2 Comments:   Supine to sit [] [] [] [] [] [] [x] [] [] [] [x]    Sit to supine [] [] [] [] [] [] [x] [] [] [] [x]    Sit to stand [] [] [] [] [] [x] [] [] [] [] [x] HHA   Bed to chair [] [] [] [] [] [] [] [] [] [] []    I=Independent, Mod I=Modified Independent, S=Supervision, SBA=Standby Assistance, CGA=Contact Guard Assistance,   Min=Minimal Assistance, Mod=Moderate Assistance, Max=Maximal Assistance, Total=Total Assistance, NT=Not Tested    [address] [facility] [address] [id number] AM-PAC 6 Clicks   Daily Activity Inpatient Short Form        How much help from another person does the patient currently need. .. Total A Lot A Little None   1. Putting on and taking off regular lower body clothing? [] 1   [x] 2   [] 3   [] 4   2. Bathing (including washing, rinsing, drying)? [] 1   [x] 2   [] 3   [] 4   3. Toileting, which includes using toilet, bedpan or urinal?   [] 1   [x] 2   [] 3   [] 4   4. Putting on and taking off regular upper body clothing? [] 1   [x] 2   [] 3   [] 4   5. Taking care of personal grooming such as brushing teeth? [] 1   [x] 2   [] 3   [] 4   6. Eating meals? [] 1   [] 2   [x] 3   [] 4   © 2007, Trustees of [address] [place] [address] [address] [address], under license to [employer]. All rights reserved     Score:  Initial: 13 Most Recent: X (Date: -- )   Interpretation of Tool:  Represents activities that are increasingly more difficult (i.e. Bed mobility, Transfers, Gait). PLAN:   FREQUENCY/DURATION: OT Plan of Care: 3 times/week for duration of hospital stay or until stated goals are met, whichever comes first.    PROBLEM LIST:   (Skilled intervention is medically necessary to address:)  1. Decreased ADL/Functional Activities  2. Decreased Activity Tolerance  3. Decreased AROM/PROM  4. Decreased Balance  5. Decreased Cognition  6. Decreased Coordination  7. Decreased Gait Ability  8. Decreased Strength  9. Decreased Transfer Abilities  10. Increased Pain   INTERVENTIONS PLANNED:   (Benefits and precautions of occupational therapy have been discussed with the patient.)  1. Self Care Training  2. Therapeutic Activity  3. Therapeutic Exercise/HEP  4. Neuromuscular Re-education  5.  Education     TREATMENT:     EVALUATION: Moderate Complexity : (Untimed Charge)    TREATMENT:   ( $$ Neuromuscular Re-Education: 8-22 mins   )  Co-Treatment PT/OT necessary due to patient's decreased overall endurance/tolerance levels, as well as need for high level skilled assistance to complete functional transfers/mobility and functional tasks  Neuromuscular Re-education (20 Minutes): Neuromuscular Re-education included Balance Training, Coordination training, Postural training, Sitting balance training, Standing balance training and Visual field awareness training to improve Balance, Coordination, Functional Mobility, Postural Control and Proprioception.     TREATMENT GRID:  N/A    AFTER TREATMENT POSITION/PRECAUTIONS:  Bed, Needs within reach, RN notified, Visitors at bedside and all bed rails up maintaining seizure precautions    INTERDISCIPLINARY COLLABORATION:  RN/PCT, PT/PTA and OT/RUEDA    TOTAL TREATMENT DURATION:  OT Patient Time In/Time Out  Time In: 1014  Time Out: [address] [facility] 22, OT

## 2021-04-09 ENCOUNTER — APPOINTMENT (OUTPATIENT)
Dept: ULTRASOUND IMAGING | Age: 61
DRG: 100 | End: 2021-04-09
Attending: STUDENT IN AN ORGANIZED HEALTH CARE EDUCATION/TRAINING PROGRAM
Payer: MEDICARE

## 2021-04-09 LAB
ANION GAP SERPL CALC-SCNC: 4 MMOL/L (ref 7–16)
BASOPHILS # BLD: 0 K/UL (ref 0–0.2)
BASOPHILS NFR BLD: 0 % (ref 0–2)
BUN SERPL-MCNC: 15 MG/DL (ref 6–23)
CALCIUM SERPL-MCNC: 8.6 MG/DL (ref 8.3–10.4)
CHLORIDE SERPL-SCNC: 112 MMOL/L (ref 98–107)
CO2 SERPL-SCNC: 24 MMOL/L (ref 21–32)
CREAT SERPL-MCNC: 0.65 MG/DL (ref 0.6–1)
DIFFERENTIAL METHOD BLD: ABNORMAL
EOSINOPHIL # BLD: 0.5 K/UL (ref 0–0.8)
EOSINOPHIL NFR BLD: 4 % (ref 0.5–7.8)
ERYTHROCYTE [DISTWIDTH] IN BLOOD BY AUTOMATED COUNT: 12.9 % (ref 11.9–14.6)
GLUCOSE SERPL-MCNC: 115 MG/DL (ref 65–100)
HCT VFR BLD AUTO: 33.7 % (ref 35.8–46.3)
HGB BLD-MCNC: 10.9 G/DL (ref 11.7–15.4)
IMM GRANULOCYTES # BLD AUTO: 0.1 K/UL (ref 0–0.5)
IMM GRANULOCYTES NFR BLD AUTO: 1 % (ref 0–5)
LYMPHOCYTES # BLD: 2.4 K/UL (ref 0.5–4.6)
LYMPHOCYTES NFR BLD: 19 % (ref 13–44)
MCH RBC QN AUTO: 31.4 PG (ref 26.1–32.9)
MCHC RBC AUTO-ENTMCNC: 32.3 G/DL (ref 31.4–35)
MCV RBC AUTO: 97.1 FL (ref 79.6–97.8)
MONOCYTES # BLD: 1.2 K/UL (ref 0.1–1.3)
MONOCYTES NFR BLD: 9 % (ref 4–12)
NEUTS SEG # BLD: 8.5 K/UL (ref 1.7–8.2)
NEUTS SEG NFR BLD: 67 % (ref 43–78)
NRBC # BLD: 0 K/UL (ref 0–0.2)
PLATELET # BLD AUTO: 228 K/UL (ref 150–450)
PMV BLD AUTO: 9.1 FL (ref 9.4–12.3)
POTASSIUM SERPL-SCNC: 3.8 MMOL/L (ref 3.5–5.1)
RBC # BLD AUTO: 3.47 M/UL (ref 4.05–5.2)
SODIUM SERPL-SCNC: 140 MMOL/L (ref 136–145)
WBC # BLD AUTO: 12.6 K/UL (ref 4.3–11.1)

## 2021-04-09 PROCEDURE — 85025 COMPLETE CBC W/AUTO DIFF WBC: CPT

## 2021-04-09 PROCEDURE — 93970 EXTREMITY STUDY: CPT

## 2021-04-09 PROCEDURE — 80048 BASIC METABOLIC PNL TOTAL CA: CPT

## 2021-04-09 PROCEDURE — 36415 COLL VENOUS BLD VENIPUNCTURE: CPT

## 2021-04-09 PROCEDURE — 77030027138 HC INCENT SPIROMETER -A

## 2021-04-09 PROCEDURE — 74011250637 HC RX REV CODE- 250/637: Performed by: STUDENT IN AN ORGANIZED HEALTH CARE EDUCATION/TRAINING PROGRAM

## 2021-04-09 PROCEDURE — 95816 EEG AWAKE AND DROWSY: CPT | Performed by: PSYCHIATRY & NEUROLOGY

## 2021-04-09 PROCEDURE — 65270000029 HC RM PRIVATE

## 2021-04-09 PROCEDURE — 74011250636 HC RX REV CODE- 250/636: Performed by: STUDENT IN AN ORGANIZED HEALTH CARE EDUCATION/TRAINING PROGRAM

## 2021-04-09 PROCEDURE — 74011000258 HC RX REV CODE- 258: Performed by: STUDENT IN AN ORGANIZED HEALTH CARE EDUCATION/TRAINING PROGRAM

## 2021-04-09 RX ORDER — DOCUSATE SODIUM 100 MG/1
100 CAPSULE, LIQUID FILLED ORAL 2 TIMES DAILY
Status: DISCONTINUED | OUTPATIENT
Start: 2021-04-09 | End: 2021-04-10

## 2021-04-09 RX ORDER — ESCITALOPRAM OXALATE 10 MG/1
10 TABLET ORAL DAILY
Status: DISCONTINUED | OUTPATIENT
Start: 2021-04-10 | End: 2021-04-11 | Stop reason: HOSPADM

## 2021-04-09 RX ORDER — AMLODIPINE BESYLATE 10 MG/1
10 TABLET ORAL DAILY
Status: DISCONTINUED | OUTPATIENT
Start: 2021-04-09 | End: 2021-04-11

## 2021-04-09 RX ORDER — POLYETHYLENE GLYCOL 3350 17 G/17G
17 POWDER, FOR SOLUTION ORAL DAILY
Status: DISCONTINUED | OUTPATIENT
Start: 2021-04-09 | End: 2021-04-11 | Stop reason: HOSPADM

## 2021-04-09 RX ADMIN — METOPROLOL SUCCINATE 50 MG: 50 TABLET, EXTENDED RELEASE ORAL at 09:23

## 2021-04-09 RX ADMIN — LORAZEPAM 0.5 MG: 0.5 TABLET ORAL at 17:54

## 2021-04-09 RX ADMIN — PROPRANOLOL HYDROCHLORIDE 20 MG: 10 TABLET ORAL at 09:16

## 2021-04-09 RX ADMIN — CEFTRIAXONE SODIUM 1 G: 1 INJECTION, POWDER, FOR SOLUTION INTRAMUSCULAR; INTRAVENOUS at 05:24

## 2021-04-09 RX ADMIN — DONEPEZIL HYDROCHLORIDE 10 MG: 5 TABLET, FILM COATED ORAL at 23:19

## 2021-04-09 RX ADMIN — LEVETIRACETAM 500 MG: 100 INJECTION, SOLUTION INTRAVENOUS at 12:45

## 2021-04-09 RX ADMIN — DOCUSATE SODIUM 100 MG: 100 CAPSULE, LIQUID FILLED ORAL at 18:15

## 2021-04-09 RX ADMIN — Medication 10 ML: at 05:24

## 2021-04-09 RX ADMIN — MEMANTINE HYDROCHLORIDE 10 MG: 5 TABLET ORAL at 17:53

## 2021-04-09 RX ADMIN — PROPRANOLOL HYDROCHLORIDE 20 MG: 10 TABLET ORAL at 17:53

## 2021-04-09 RX ADMIN — Medication 10 ML: at 14:00

## 2021-04-09 RX ADMIN — FAMOTIDINE 20 MG: 20 TABLET ORAL at 17:54

## 2021-04-09 RX ADMIN — FAMOTIDINE 20 MG: 20 TABLET ORAL at 09:22

## 2021-04-09 RX ADMIN — POLYETHYLENE GLYCOL 3350 17 G: 17 POWDER, FOR SOLUTION ORAL at 17:00

## 2021-04-09 RX ADMIN — AMLODIPINE BESYLATE 10 MG: 10 TABLET ORAL at 09:22

## 2021-04-09 RX ADMIN — MEMANTINE HYDROCHLORIDE 10 MG: 5 TABLET ORAL at 09:22

## 2021-04-09 RX ADMIN — LEVETIRACETAM 500 MG: 100 INJECTION, SOLUTION INTRAVENOUS at 00:44

## 2021-04-09 RX ADMIN — LORAZEPAM 0.5 MG: 0.5 TABLET ORAL at 09:23

## 2021-04-09 RX ADMIN — Medication 10 ML: at 23:19

## 2021-04-09 NOTE — PROGRESS NOTES
[facility] Hospitalist Progress Note     Name:  Juhi Garcia  Age:60 y.o. Sex:female   :  1960    MRN:  [id number]     Admit Date:  2021    Reason for Admission:  Seizure [facility]) [R56.9]    Hospital Course/Interval history:     Juhi Garcia is a 64 y.o. female with medical history of early onset Alzheimer's dementia, hypertension, chronic benzo use, who presented to ED with seizure. Patient altered and likely postictal my examination the ED, Gilberto history obtained from  at bedside.  states that he put patient to bed last night in her normal state of health without any acute issues. She woke up in walk downstairs, which is not unusual for her with her underlying dementia.  stayed in bed with her for some time after, and subsequently found her to be convulsing, with whole body shaking. He does not know if she lost any urine or stool, but thinks that she may have had some urinary incontinence. No evidence of tongue biting. Of note, patient is on Ativan 1 mg 3 times daily for her anxiety and agitation. Yesterday  noticed that she had ran out and she did not receive any Ativan yesterday.  denies any complaints at home, although patient mostly confused at baseline and does not make much sense with her words. Denies any fevers, chills, pain, dysuria, urinary issues, diarrhea, cough or congestion. He denies any previous personal or family history of seizure disorder. In the ED, patient again had seizure activity with whole body convulsing. Her seizures aborted with Ativan administration. She was post-ictal in the ED, confused and not making any sense with her words. Labs positive for mild UTI and IGNACIO.        Assessment & Plan     #Seizures  - s/p Ativan IV in ED for seizure activity  - likely from benzo withdrawal, UTI but may be at risk with underlying dementia  - cont on Keppra IV BID  - will cont with ativan 0.5mg BID at lower dose (takes 1mg TID) to prevent possible withdrawal seizures  - brain MRI negative for infarct or other abnormality  - EEG on admission with oddly abnormal EEG with diffuse slowing consistent with Alzheimer's disease  - ativan IV prn for seizure activity  - consulted neurology for assistance, follows Dr. Sandra Reaves with neurology in outpatient setting    4/9: Neurology recommending repeat EEG today and will determine further AED needs pending this. Cont with keppra for now    #Acute Hypoxic Respiratory Failure  - still on 2L NC  - CXR clear on admission, likely atelectasis  - stop IVF if contributing  - encourage incentive spirometry use     #UTI  - UA mildly positive  - UCx with GNR, await speciation and specificity  - cont on Rocephin for now     #IGNACIO  - Cr 1.09 on admission  - improved to 0.74 on IVF, stop IVF  - recheck in AM     #Alzheimers Dementia  - cont home namenda and aricept  - hold home seroquel with patient not back to neurologic baseline  - cont home lexapro qam as patient takes in morning     #HTN/tremor  - cont home toprol and propanolol  - started on norvasc for better BP control, which has improved      Diet:  DIET REGULAR  DVT PPx: hold Lovenox with severe bruising  Code status: Full Code  Disposition/Expected LOS: 1-2 midnight hospital stay for seiuzre workup. PT/OT/ST consulted  Surrogate decision-maker:  at bedside, Rita Faria, [phone]        Subjective (04/09/21):    Seen and examined at bedside this morning. No acute events overnight.  at bedside states that she did not have any seizure-like activity per night, but is still not back to her neurological baseline. Patient with likely prolonged postictal state. Discussed with neurology on floor, and seeing patient today. Denies any cough or congestion overnight, SOB, fevers or chills. Review of Systems: 14 point review of systems is otherwise negative with the exception of the elements mentioned above.     Objective:     Patient Vitals for the past 24 hrs:   Temp Pulse Resp BP SpO2   04/09/21 1051 98.7 °F (37.1 °C) 71 18 126/80 98 %   04/09/21 0800 98.1 °F (36.7 °C) 73 18 131/82 96 %   04/09/21 0656 98.8 °F (37.1 °C) 67 18 128/83 97 %   04/09/21 0501 98 °F (36.7 °C) 69 18 (!) 146/95 94 %   04/08/21 2329 98.1 °F (36.7 °C) 72 18 127/88 98 %   04/08/21 1943 99.1 °F (37.3 °C) 77 18 120/86 96 %     Oxygen Therapy  O2 Sat (%): 98 % (04/09/21 1051)  Pulse via Oximetry: 68 beats per minute (04/08/21 1107)  O2 Device: Nasal cannula (04/08/21 1107)  O2 Flow Rate (L/min): 2 l/min (04/08/21 1107)    Body mass index is 28.19 kg/m².     Physical Exam:   General:  No acute distress, speaking in one word sentences, no use of accessory muscles   Lungs:  Clear to auscultation bilaterally   CV:  Regular rate and rhythm with normal S1 and S2   Abdomen:  Soft, nontender, nondistended, normoactive bowel sounds   Extremities:  No cyanosis clubbing or edema   Neuro:  Some generalized weakness, improved and able to squeee by fingers with both upper extremities, wiggles both lower ext digits  Psych:  Normal affect, pleasantly confused    Data Review:  I have reviewed all labs, meds, and studies from the last 24 hours:    Labs:    Recent Results (from the past 24 hour(s))   METABOLIC PANEL, BASIC    Collection Time: 04/09/21  4:48 AM   Result Value Ref Range    Sodium 140 136 - 145 mmol/L    Potassium 3.8 3.5 - 5.1 mmol/L    Chloride 112 (H) 98 - 107 mmol/L    CO2 24 21 - 32 mmol/L    Anion gap 4 (L) 7 - 16 mmol/L    Glucose 115 (H) 65 - 100 mg/dL    BUN 15 6 - 23 MG/DL    Creatinine 0.65 0.6 - 1.0 MG/DL    GFR est AA >60 >60 ml/min/1.73m2    GFR est non-AA >60 >60 ml/min/1.73m2    Calcium 8.6 8.3 - 10.4 MG/DL   CBC WITH AUTOMATED DIFF    Collection Time: 04/09/21  4:48 AM   Result Value Ref Range    WBC 12.6 (H) 4.3 - 11.1 K/uL    RBC 3.47 (L) 4.05 - 5.2 M/uL    HGB 10.9 (L) 11.7 - 15.4 g/dL    HCT 33.7 (L) 35.8 - 46.3 %    MCV 97.1 79.6 - 97.8 FL    MCH 31.4 26.1 - 32.9 PG MCHC 32.3 31.4 - 35.0 g/dL    RDW 12.9 11.9 - 14.6 %    PLATELET 672 737 - 587 K/uL    MPV 9.1 (L) 9.4 - 12.3 FL    ABSOLUTE NRBC 0.00 0.0 - 0.2 K/uL    DF AUTOMATED      NEUTROPHILS 67 43 - 78 %    LYMPHOCYTES 19 13 - 44 %    MONOCYTES 9 4.0 - 12.0 %    EOSINOPHILS 4 0.5 - 7.8 %    BASOPHILS 0 0.0 - 2.0 %    IMMATURE GRANULOCYTES 1 0.0 - 5.0 %    ABS. NEUTROPHILS 8.5 (H) 1.7 - 8.2 K/UL    ABS. LYMPHOCYTES 2.4 0.5 - 4.6 K/UL    ABS. MONOCYTES 1.2 0.1 - 1.3 K/UL    ABS. EOSINOPHILS 0.5 0.0 - 0.8 K/UL    ABS. BASOPHILS 0.0 0.0 - 0.2 K/UL    ABS. IMM. GRANS. 0.1 0.0 - 0.5 K/UL       All Micro Results     Procedure Component Value Units Date/Time    CULTURE, URINE [[id number]]  (Abnormal) Collected: 04/07/21 1200    Order Status: Completed Specimen: Urine from Clean catch Updated: 04/09/21 0851     Special Requests: NO SPECIAL REQUESTS        Culture result:       >100,000 COLONIES/mL GRAM NEGATIVE RODS IDENTIFICATION AND SUSCEPTIBILITY TO FOLLOW                  <10,000 COLONIES/mL MIXED SKIN MUSA ISOLATED                EKG Results     Procedure 720 Value Units Date/Time    EKG, 12 LEAD, INITIAL [[id number]] Collected: 04/07/21 0054    Order Status: Completed Updated: 04/07/21 1158     Ventricular Rate 64 BPM      Atrial Rate 64 BPM      P-R Interval 172 ms      QRS Duration 98 ms      Q-T Interval 428 ms      QTC Calculation (Bezet) 441 ms      Calculated P Axis 38 degrees      Calculated R Axis 67 degrees      Calculated T Axis 83 degrees      Diagnosis --     !! AGE AND GENDER SPECIFIC ECG ANALYSIS !! Normal sinus rhythm  Nonspecific T wave abnormality  Abnormal ECG  When compared with ECG of 13-SEP-2000 06:57,  Vent. rate has decreased BY  42 BPM    Confirmed by ST RAIMUNDO ADKINS MD (), FRANCES ELIZABETH ([id number]) on 4/7/2021 11:58:36 AM            Other Studies:  No results found.     Current Meds:   Current Facility-Administered Medications   Medication Dose Route Frequency    amLODIPine (NORVASC) tablet 10 mg  10 mg Oral DAILY  donepeziL (ARICEPT) tablet 10 mg  10 mg Oral DAILY    escitalopram oxalate (LEXAPRO) tablet 10 mg  10 mg Oral QPM    memantine (NAMENDA) tablet 10 mg  10 mg Oral BID    sodium chloride (NS) flush 5-40 mL  5-40 mL IntraVENous Q8H    sodium chloride (NS) flush 5-40 mL  5-40 mL IntraVENous PRN    acetaminophen (TYLENOL) tablet 650 mg  650 mg Oral Q6H PRN    Or    acetaminophen (TYLENOL) suppository 650 mg  650 mg Rectal Q6H PRN    polyethylene glycol (MIRALAX) packet 17 g  17 g Oral DAILY PRN    bisacodyL (DULCOLAX) suppository 10 mg  10 mg Rectal DAILY PRN    promethazine (PHENERGAN) tablet 25 mg  25 mg Oral Q6H PRN    Or    ondansetron (ZOFRAN) injection 4 mg  4 mg IntraVENous Q6H PRN    famotidine (PEPCID) tablet 20 mg  20 mg Oral BID PRN    levETIRAcetam (KEPPRA) 500 mg in 0.9% sodium chloride (MBP/ADV) 100 mL MBP  500 mg IntraVENous Q12H    0.9% sodium chloride infusion  75 mL/hr IntraVENous CONTINUOUS    LORazepam (ATIVAN) tablet 0.5 mg  0.5 mg Oral BID    metoprolol succinate (TOPROL-XL) XL tablet 50 mg  50 mg Oral DAILY    propranoloL (INDERAL) tablet 20 mg  20 mg Oral BID    LORazepam (ATIVAN) injection 2 mg  2 mg IntraVENous Q2H PRN    famotidine (PEPCID) tablet 20 mg  20 mg Oral BID       Problem List:  Hospital Problems as of 4/9/2021 Date Reviewed: 4/7/2021          Codes Class Noted - Resolved POA    Seizure ([facility] [address] ICD-10-CM: R56.9  ICD-9-CM: 780.39  4/7/2021 - Present Unknown        Early onset Alzheimer's dementia without behavioral disturbance (HCC) ICD-10-CM: G30.0, F02.80  ICD-9-CM: 331.0, 294.10  6/8/2017 - Present Yes        Benign essential HTN ICD-10-CM: I10  ICD-9-CM: 401.1  11/18/2015 - Present Yes               Part of this note was written by using a voice dictation software and the note has been proof read but may still contain some grammatical/other typographical errors.     Signed By: Kayla Bridges MD   [facility] Hospitalist Service    April 9, 2021  5:15 PM

## 2021-04-09 NOTE — PROGRESS NOTES
CM screened chart for potential discharge needs. Therapy currently recommending HH PT and no DME needs. CM in to see patient and spouse at discharge. Patient does have dementia and  is able to review discharge plan with CM.  states that patient has a caregiver 24/7 and they assist patient with all ADLs. CM reviewed therapy recommendations for [place].  states that he is not ready to make decision at this time as he would like to see how patient progresses with therapy in order to determine if therapy is needed in the home. CM will continue to follow for discharge planning.     Care Management Interventions  PCP Verified by CM: Ajay Silver MD)  Transition of Care Consult (CM Consult): Discharge Planning  Physical Therapy Consult: Yes  Occupational Therapy Consult: Yes  Speech Therapy Consult: Yes  Current Support Network: Own Home, Lives with Spouse, Has Personal Caregivers  Discharge Location  Discharge Placement: Home with home health

## 2021-04-09 NOTE — PROGRESS NOTES
Problem: Falls - Risk of  Goal: *Absence of Falls  Description: Document Justin Franks Fall Risk and appropriate interventions in the flowsheet. Outcome: Progressing Towards Goal  Note: Fall Risk Interventions:  Mobility Interventions: Bed/chair exit alarm    Mentation Interventions: Adequate sleep, hydration, pain control    Medication Interventions: Assess postural VS orthostatic hypotension    Elimination Interventions: Bed/chair exit alarm              Problem: Patient Education: Go to Patient Education Activity  Goal: Patient/Family Education  Outcome: Progressing Towards Goal     Problem: Pressure Injury - Risk of  Goal: *Prevention of pressure injury  Description: Document Ashwin Scale and appropriate interventions in the flowsheet.   Outcome: Progressing Towards Goal  Note: Pressure Injury Interventions:  Sensory Interventions: Assess changes in LOC    Moisture Interventions: Absorbent underpads, Apply protective barrier, creams and emollients, Offer toileting Q_hr    Activity Interventions: Pressure redistribution bed/mattress(bed type)    Mobility Interventions: Pressure redistribution bed/mattress (bed type)    Nutrition Interventions: Document food/fluid/supplement intake                     Problem: Patient Education: Go to Patient Education Activity  Goal: Patient/Family Education  Outcome: Progressing Towards Goal     Problem: Patient Education: Go to Patient Education Activity  Goal: Patient/Family Education  Outcome: Progressing Towards Goal     Problem: Patient Education: Go to Patient Education Activity  Goal: Patient/Family Education  Outcome: Progressing Towards Goal

## 2021-04-09 NOTE — PROCEDURES
[facility] Neurology   Routine Electroencephalogram Report      JAYLAN April 9/2021:  time Start: 1149 time End: 7095    EEG Number:     Indication: Seizure, prolonged postictal confusion, history of early Alzheimer's disease advanced    Medications:   Current Facility-Administered Medications   Medication Dose Route Frequency Provider Last Rate Last Admin    amLODIPine (NORVASC) tablet 10 mg  10 mg Oral DAILY Max Boss MD   10 mg at 04/09/21 0922    [START ON 4/10/2021] escitalopram oxalate (LEXAPRO) tablet 10 mg  10 mg Oral DAILY Max Boss MD        donepeziL (ARICEPT) tablet 10 mg  10 mg Oral DAILY Max Boss MD   10 mg at 04/08/21 2126    memantine (NAMENDA) tablet 10 mg  10 mg Oral BID Max Boss MD   10 mg at 04/09/21 3725    sodium chloride (NS) flush 5-40 mL  5-40 mL IntraVENous Q8H Max Boss MD   10 mL at 04/09/21 1400    sodium chloride (NS) flush 5-40 mL  5-40 mL IntraVENous PRN Max Boss MD        acetaminophen (TYLENOL) tablet 650 mg  650 mg Oral Q6H PRN Max Boss MD        Or   Hardik Bear acetaminophen (TYLENOL) suppository 650 mg  650 mg Rectal Q6H PRN aMx Boss MD        polyethylene glycol (MIRALAX) packet 17 g  17 g Oral DAILY PRN Max Boss MD        bisacodyL (DULCOLAX) suppository 10 mg  10 mg Rectal DAILY PRN Max Boss MD        promethazine (PHENERGAN) tablet 25 mg  25 mg Oral Q6H PRN Max Boss MD        Or    ondansetron [facility]) injection 4 mg  4 mg IntraVENous Q6H PRN Max Boss MD        famotidine (PEPCID) tablet 20 mg  20 mg Oral BID PRN Max Boss MD        levETIRAcetam (KEPPRA) 500 mg in 0.9% sodium chloride (MBP/ADV) 100 mL MBP  500 mg IntraVENous Q12H Max Boss MD   500 mg at 04/09/21 1245    LORazepam (ATIVAN) tablet 0.5 mg  0.5 mg Oral BID Max Boss MD   0.5 mg at 04/09/21 0889    metoprolol succinate (TOPROL-XL) XL tablet 50 mg  50 mg Oral DAILY Max Boss MD   50 mg at 04/09/21 4728    propranoloL (INDERAL) tablet 20 mg  20 mg Oral BID Ginger Perez MD   20 mg at 04/09/21 0916    LORazepam (ATIVAN) injection 2 mg  2 mg IntraVENous Q2H PRN Ginger Perez MD   2 mg at 04/07/21 1412    famotidine (PEPCID) tablet 20 mg  20 mg Oral BID Ginger Perez MD   20 mg at 04/09/21 1649       Technique: The EEG was recorded on a 32 channel [employer] digital EEG machine. A full electrode headset was applied in accordance with the International 10-20 System of Electrode Placement. All impedances are less than 5000 Ohms. Time locked digital video of the patient was recorded and reviewed as needed for clinical correlation     State of Consciousness: awake       Description:  Waking EEG is fairly well-organized with nearly continuous rhythmic 7-8 Hz 30-50 µV rhythmic activity noted symmetrically in the posterior head regions bilaterally. Reactivity to eye opening and eye closure is good. Intermittent EMG implant artifact is noted frontally. Small amount of fast activity is noted frontally and centrally. No focal slowing or epileptiform activity is seen. When compared to the previous EEG, the EEG remains mildly slow but is better organized    Activation Procedures:  Hyperventilation: Not performed  Photic Stimulation: Did not alter the record      Interpretation: This is a mildly abnormal EEG due to the presence of mild slowing of the rhythmic posterior background. This suggests that mild diffuse impairment of cerebral function. There has been some improvement since the previous EEG.

## 2021-04-09 NOTE — PROCEDURES
3 [facility] Neurology   Routine Electroencephalogram Report      DATE: April 7, 2021 time Start: 0281 time End: 3161    EEG Number:     Indication: Seizures, early onset Alzheimer's disease, possible Ativan withdrawal    Medications:   Current Facility-Administered Medications   Medication Dose Route Frequency Provider Last Rate Last Admin    amLODIPine (NORVASC) tablet 10 mg  10 mg Oral DAILY Ginger Perez MD   10 mg at 04/09/21 5262    donepeziL (ARICEPT) tablet 10 mg  10 mg Oral DAILY Ginger Perez MD   10 mg at 04/08/21 2126    escitalopram oxalate (LEXAPRO) tablet 10 mg  10 mg Oral QPM Ginger Perez MD   10 mg at 04/08/21 1826    memantine (NAMENDA) tablet 10 mg  10 mg Oral BID Ginger Perez MD   10 mg at 04/09/21 8263    sodium chloride (NS) flush 5-40 mL  5-40 mL IntraVENous Q8H Ginger Perez MD   10 mL at 04/09/21 0524    sodium chloride (NS) flush 5-40 mL  5-40 mL IntraVENous PRN Ginger Perez MD        acetaminophen (TYLENOL) tablet 650 mg  650 mg Oral Q6H PRN Ginger Perez MD        Or   [place] acetaminophen (TYLENOL) suppository 650 mg  650 mg Rectal Q6H PRN Ginger Perez MD        polyethylene glycol (MIRALAX) packet 17 g  17 g Oral DAILY PRN Ginger Perez MD        bisacodyL (DULCOLAX) suppository 10 mg  10 mg Rectal DAILY PRN Ginger Perez MD        promethazine (PHENERGAN) tablet 25 mg  25 mg Oral Q6H PRN Ginger Perez MD        Or    ondansetron [facility] injection 4 mg  4 mg IntraVENous Q6H PRN Ginger Perez MD        famotidine (PEPCID) tablet 20 mg  20 mg Oral BID PRN Ginger Perez MD        levETIRAcetam (KEPPRA) 500 mg in 0.9% sodium chloride (MBP/ADV) 100 mL MBP  500 mg IntraVENous Q12H Ginger Perez MD   500 mg at 04/09/21 0044    0.9% sodium chloride infusion  75 mL/hr IntraVENous CONTINUOUS Ginger Perez MD 75 mL/hr at 04/08/21 2136 75 mL/hr at 04/08/21 2136    LORazepam (ATIVAN) tablet 0.5 mg  0.5 mg Oral BID Ginger Perez MD   0.5 mg at 04/09/21 0923    metoprolol succinate (TOPROL-XL) XL tablet 50 mg  50 mg Oral DAILY Allston Jason, MD   50 mg at 04/09/21 3420    propranoloL (INDERAL) tablet 20 mg  20 mg Oral BID Allston Jason, MD   20 mg at 04/09/21 0916    LORazepam (ATIVAN) injection 2 mg  2 mg IntraVENous Q2H PRN Allston Jason, MD   2 mg at 04/07/21 1412    famotidine (PEPCID) tablet 20 mg  20 mg Oral BID Allston Jason, MD   20 mg at 04/09/21 0685       Technique: The EEG was recorded on a 32 channel [employer] digital EEG machine. A full electrode headset was applied in accordance with the International 10-20 System of Electrode Placement. All impedances are less than 5000 Ohms. Time locked digital video of the patient was recorded and reviewed as needed for clinical correlation     State of Consciousness: awake       Description: The waking EEG demonstrates diffuse mixed frequency 5-7 Hz, 30-50 µV slowing which is present bilaterally and symmetrically. Intermittent EMG and eye blink artifact is present. Reactivity to eye opening and eye closure is good. No focal slowing or epileptiform activity is seen    Activation Procedures:  Hyperventilation: Not performed  Photic Stimulation: Did not alter the record      Interpretation:  This is a mildly abnormal EEG due to the presence of due to mild diffuse low voltage slowing consistent with the clinical diagnosis of early onset Alzheimer's disease

## 2021-04-09 NOTE — CONSULTS
Consult    Patient: Mary Carter MRN: [id number]  SSN: [id number]    YOB: 1960  Age: 64 y.o. Sex: female        Assessment:     1. Advanced Alzheimer's disease, early onset. 2.  Seizure x2. This occurred in the setting of a early abstinence from benzodiazepines. At this point it is unclear if this represents a true provoked seizure i.e. benzodiazepine withdrawal seizure or whether the patient has a tendency toward seizures, probably secondary to Alzheimer's disease, and benzodiazepine withdrawal lowered her seizure threshold. 3.  Probable prolonged postictal state  Hospital Problems  Date Reviewed: 4/7/2021          Codes Class Noted POA    Seizure ([facility] ICD-10-CM: R56.9  ICD-9-CM: 780.39  4/7/2021 Unknown        Early onset Alzheimer's dementia without behavioral disturbance ([facility] ICD-10-CM: G30.0, F02.80  ICD-9-CM: 331.0, 294.10  6/8/2017 Yes        Benign essential HTN ICD-10-CM: I10  ICD-9-CM: 401.1  11/18/2015 Yes              Plan:     Discussed with  who is at bedside. Given the circumstances of the seizure would probably not initiate antiepileptic drug on but she has a recurrence without the intervening variable of benzodiazepine withdrawal.  Patients with Alzheimer's disease are at increased risk compared to age matched controls without Alzheimer's disease, of seizures. We will repeat EEG to for evidence of overall physiological trending as initial EEG was fairly slow    Unless we see epileptiform activity or unless there is a clearly nonprovoked seizure I would recommend not treating and would discontinue Keppra. Patient's  strongly would prefer that she not be treated with Keppra unless it is absolutely necessary. It is possible that Keppra is contributing to her current condition  Subjective:      Mary Carter is a 64 y.o. female who is being seen for seizures.   Patient is an established patient of Dr. Supriya Maynard at [facility] where she is followed for early onset Alzheimer's disease. The patient's  who is present at bedside and the medical record reflects a rapid cognitive decline over the past 4 to 5years. At this time the patient requires assistance with all activities of daily living. She has had significant behavioral disturbance and was started on Ativan. Unfortunately about 24 hours prior to presentation with a seizure she had run out of Ativan because of a conflict on refills between prescriber and the pharmacy. Patient's  states that she was lying in bed next to him when he noted what sounds like a myoclonic jerk followed by generalized tonic rigidity and then clonic shaking. Patient was transported to the emergency department where she had another witnessed episode. Since admission on the seventh patient has been slowly improving but is still no longer at her neurological baseline.     Past Medical History:   Diagnosis Date    Alzheimer's dementia ([facility]     Hypertension     Menopause      Past Surgical History:   Procedure Laterality Date    HX BREAST REDUCTION Bilateral       Family History   Problem Relation Age of Onset    Stroke Mother      Social History     Tobacco Use    Smoking status: Former Smoker     Quit date:      Years since quittin.2    Smokeless tobacco: Never Used   Substance Use Topics    Alcohol use: Not Currently     Frequency: Never     Binge frequency: Never     Comment: Wine      Current Facility-Administered Medications   Medication Dose Route Frequency Provider Last Rate Last Admin    amLODIPine (NORVASC) tablet 10 mg  10 mg Oral DAILY Chuck Camacho MD   10 mg at 21 7246    donepeziL (ARICEPT) tablet 10 mg  10 mg Oral DAILY Chuck Camacho MD   10 mg at 21    escitalopram oxalate (LEXAPRO) tablet 10 mg  10 mg Oral QPM Chuck Camacho MD   10 mg at 21 182    memantine (NAMENDA) tablet 10 mg  10 mg Oral BID Chuck Camacho MD   10 mg at 21 180    sodium chloride (NS) flush 5-40 mL  5-40 mL IntraVENous Q8H Rich Parker MD   10 mL at 04/09/21 0524    sodium chloride (NS) flush 5-40 mL  5-40 mL IntraVENous PRN Rich Parker MD        acetaminophen (TYLENOL) tablet 650 mg  650 mg Oral Q6H PRN Rich Parker MD        Or   [facility] acetaminophen (TYLENOL) suppository 650 mg  650 mg Rectal Q6H PRN Rich Parker MD        polyethylene glycol (MIRALAX) packet 17 g  17 g Oral DAILY PRN Rich Parker MD        bisacodyL (DULCOLAX) suppository 10 mg  10 mg Rectal DAILY PRN Rich Parker MD        promethazine (PHENERGAN) tablet 25 mg  25 mg Oral Q6H PRN Rich Parker MD        Or    [facility] 4 mg  4 mg IntraVENous Q6H PRN Rich Parker MD        famotidine (PEPCID) tablet 20 mg  20 mg Oral BID PRN Rich Parker MD        levETIRAcetam (KEPPRA) 500 mg in 0.9% sodium chloride (MBP/ADV) 100 mL MBP  500 mg IntraVENous Q12H Rich Parker MD   500 mg at 04/09/21 0044    0.9% sodium chloride infusion  75 mL/hr IntraVENous CONTINUOUS Rich Parker MD 75 mL/hr at 04/08/21 2136 75 mL/hr at 04/08/21 2136    LORazepam (ATIVAN) tablet 0.5 mg  0.5 mg Oral BID Rich Parker MD   0.5 mg at 04/09/21 3416    metoprolol succinate (TOPROL-XL) XL tablet 50 mg  50 mg Oral DAILY Rich Parker MD   50 mg at 04/09/21 3397    propranoloL (INDERAL) tablet 20 mg  20 mg Oral BID Rich Parker MD   20 mg at 04/09/21 0916    LORazepam (ATIVAN) injection 2 mg  2 mg IntraVENous Q2H PRN Rich Parker MD   2 mg at 04/07/21 1412    famotidine (PEPCID) tablet 20 mg  20 mg Oral BID Rich Parker MD   20 mg at 04/09/21 0944        No Known Allergies    Review of Systems: Patient is unable to provide a review of systems due to her medical condition        Objective:     Vitals:    04/08/21 2329 04/09/21 0501 04/09/21 0656 04/09/21 0800   BP: 127/88 (!) 146/95 128/83 131/82   Pulse: 72 69 67 73   Resp: 18 18 18 18   Temp: 98.1 °F (36.7 °C) 98 °F (36.7 °C) 98.8 °F (37.1 °C) 98.1 °F (36.7 °C)   SpO2: 98% 94% 97% 96%   Weight:       Height:            Physical Exam:      General: well nourished, appears stated age    Eyes: no proptosis or exophthalmos; conjunctivae clear, sclerae non-icteric    Chest: clear to auscultation    Cardiac: normal S1 S2; no murmurs gallop or rubs    Neurological:    MSE: alert, oriented times 1; could not name  at bedside; fluent speech; no paraphasic errors; follows commands with difficulty    CN 2: visual fields full; no afferent pupillary defect; VA not checked; fundoscopic exam ... CN 3,4,6: Pupils symmetrical in size, reactive to light directly and consensually; no ptosis; full versions and ductions  CN 5: facial sensation intact to light touch and pin prick. Corneal reflex . .. CN 7: Symmetrical facial tone and movements  CN 8 responds to spoken voice  CN 9,10; palate symmetrical gag intact  CN 11: head turn and shoulder shrug intact  CN 12: tongue midline without atrophy or fasiculations    Motor:  Limited motor testing possible due to lack of cooperation. The patient cries out in pain with attempted manual muscle testing.   The patient's  states that this is baseline and that she is \"very sensitive\"    Tone normal  Atrophy: absent    Cerebellar:  No tremor  Sensory    Grossly intact to primary modalities in all 4 extremities    Reflexes    Symmetrical and normally active at 2+ in UE and LE  Plantar response flexor bilaterally    Recent Results (from the past 24 hour(s))   GLUCOSE, POC    Collection Time: 04/08/21 11:12 AM   Result Value Ref Range    Glucose (POC) 119 (H) 65 - 100 mg/dL    Performed by [facility]    METABOLIC PANEL, BASIC    Collection Time: 04/09/21  4:48 AM   Result Value Ref Range    Sodium 140 136 - 145 mmol/L    Potassium 3.8 3.5 - 5.1 mmol/L    Chloride 112 (H) 98 - 107 mmol/L    CO2 24 21 - 32 mmol/L    Anion gap 4 (L) 7 - 16 mmol/L    Glucose 115 (H) 65 - 100 mg/dL    BUN 15 6 - 23 MG/DL    Creatinine 0.65 0.6 - 1.0 MG/DL    GFR est AA >60 >60 ml/min/1.73m2    GFR est non-AA >60 >60 ml/min/1.73m2    Calcium 8.6 8.3 - 10.4 MG/DL   CBC WITH AUTOMATED DIFF    Collection Time: 04/09/21  4:48 AM   Result Value Ref Range    WBC 12.6 (H) 4.3 - 11.1 K/uL    RBC 3.47 (L) 4.05 - 5.2 M/uL    HGB 10.9 (L) 11.7 - 15.4 g/dL    HCT 33.7 (L) 35.8 - 46.3 %    MCV 97.1 79.6 - 97.8 FL    MCH 31.4 26.1 - 32.9 PG    MCHC 32.3 31.4 - 35.0 g/dL    RDW 12.9 11.9 - 14.6 %    PLATELET 909 511 - 175 K/uL    MPV 9.1 (L) 9.4 - 12.3 FL    ABSOLUTE NRBC 0.00 0.0 - 0.2 K/uL    DF AUTOMATED      NEUTROPHILS 67 43 - 78 %    LYMPHOCYTES 19 13 - 44 %    MONOCYTES 9 4.0 - 12.0 %    EOSINOPHILS 4 0.5 - 7.8 %    BASOPHILS 0 0.0 - 2.0 %    IMMATURE GRANULOCYTES 1 0.0 - 5.0 %    ABS. NEUTROPHILS 8.5 (H) 1.7 - 8.2 K/UL    ABS. LYMPHOCYTES 2.4 0.5 - 4.6 K/UL    ABS. MONOCYTES 1.2 0.1 - 1.3 K/UL    ABS. EOSINOPHILS 0.5 0.0 - 0.8 K/UL    ABS. BASOPHILS 0.0 0.0 - 0.2 K/UL    ABS. IMM. GRANS. 0.1 0.0 - 0.5 K/UL       Lab Results   Component Value Date/Time    Cholesterol, total 213 (H) 05/29/2018 10:21 AM    HDL Cholesterol 53 05/29/2018 10:21 AM    LDL, calculated 107 (H) 05/29/2018 10:21 AM    VLDL, calculated 53 (H) 05/29/2018 10:21 AM    Triglyceride 267 (H) 05/29/2018 10:21 AM        Lab Results   Component Value Date/Time    Hemoglobin A1c 5.2 05/29/2018 10:21 AM        CT Results (most recent):  Results from Hospital Encounter encounter on 04/07/21   CT HEAD WO CONT    Narrative CT HEAD WITHOUT CONTRAST, 4/7/2021     History: Abnormal behavior. Comparison: None     Technique:   5 mm axial scans from the skull base to the vertex. All CT scans  performed at this facility use one or all of the following: Automated exposure  control, adjustment of the mA and/or kVp according to patient's size, iterative  reconstruction. Findings:  No evidence of intracranial hemorrhage is seen.   No abnormal  extra-axial fluid collections are seen.  Moderate chronic cortical volume loss  is seen which does appear advanced for the patient's age. No evidence for acute  hydrocephalus is seen. No evidence of midline shift or herniation is seen. No  abnormal edema pattern is seen in a vascular distribution to suggest large  artery infarction. Evaluation with bone windows shows no acute osseous changes. The visualized  sinuses, middle ears, and mastoid air cells are well aerated. Impression 1. No acute intracranial process evident by noncontrast CT study of the head. 2. Chronic cortical volume loss which does appear advanced for the patient's  age. This report was made using voice transcription. Despite my best efforts to avoid  any, transcription errors may persist. If there is any question about the  accuracy of the report or need for clarification, then please call [phone], or text me through [email or web address] for clarification or correction. Results for orders placed or performed during the hospital encounter of 04/07/21   EKG, 12 LEAD, INITIAL   Result Value Ref Range    Ventricular Rate 64 BPM    Atrial Rate 64 BPM    P-R Interval 172 ms    QRS Duration 98 ms    Q-T Interval 428 ms    QTC Calculation (Bezet) 441 ms    Calculated P Axis 38 degrees    Calculated R Axis 67 degrees    Calculated T Axis 83 degrees    Diagnosis       !! AGE AND GENDER SPECIFIC ECG ANALYSIS !! Normal sinus rhythm  Nonspecific T wave abnormality  Abnormal ECG  When compared with ECG of 13-SEP-2000 06:57,  Vent.  rate has decreased BY  42 BPM    Confirmed by ST RAIMUNDO ADKINS MD (), FRANCES ELIZABETH ([id number]) on 4/7/2021 11:58:36 AM          MRI Results (most recent):  Results from [place] encounter on 04/07/21   MRI BRAIN W WO CONT    Narrative EXAMINATION: BRAIN MRI 4/7/2021 4:20 PM    INDICATION: Seizure    COMPARISON: CT head April 7, 2021    TECHNIQUE: Multiplanar multisequence MRI of the brain without and with  intravenous administration of 17 cc Prohance contrast agent. FINDINGS:  Image quality is degraded due to repetitive patient motion, but remains  diagnostic utility. . Dedicated slices through the mesial temporal lobes as well  as thin postcontrast imaging were not able to be performed. No evidence of acute or recent infarct. No evidence of recent hemorrhage. Normal  cerebral parenchyma signal for age. Normal vascular flow voids. Normal size of  ventricles and extra-axial spaces for age. No abnormal contrast enhancement. No calvarial abnormality is seen. Grossly normal orbital structures. Essentially  clear paranasal sinuses and mastoid air cells. No abnormality of extracranial  soft tissues. Impression Age-appropriate MR brain without acute abnormality. US Results (most recent):  Results from Abstract encounter on 11/18/16   US TRANSVAGINAL        Most recent CTA  Results from [place] encounter on 04/07/21   CT HEAD WO CONT    Narrative CT HEAD WITHOUT CONTRAST, 4/7/2021     History: Abnormal behavior. Comparison: None     Technique:   5 mm axial scans from the skull base to the vertex. All CT scans  performed at this facility use one or all of the following: Automated exposure  control, adjustment of the mA and/or kVp according to patient's size, iterative  reconstruction. Findings:  No evidence of intracranial hemorrhage is seen. No abnormal  extra-axial fluid collections are seen. Moderate chronic cortical volume loss  is seen which does appear advanced for the patient's age. No evidence for acute  hydrocephalus is seen. No evidence of midline shift or herniation is seen. No  abnormal edema pattern is seen in a vascular distribution to suggest large  artery infarction. Evaluation with bone windows shows no acute osseous changes. The visualized  sinuses, middle ears, and mastoid air cells are well aerated. Impression 1.   No acute intracranial process evident by noncontrast CT study of the head. 2. Chronic cortical volume loss which does appear advanced for the patient's  age. This report was made using voice transcription. Despite my best efforts to avoid  any, transcription errors may persist. If there is any question about the  accuracy of the report or need for clarification, then please call [phone], or text me through [email or web address] for clarification or correction. Most recent MRI  Results from [place] encounter on 04/07/21   MRI BRAIN W WO CONT    Narrative EXAMINATION: BRAIN MRI 4/7/2021 4:20 PM    INDICATION: Seizure    COMPARISON: CT head April 7, 2021    TECHNIQUE: Multiplanar multisequence MRI of the brain without and with  intravenous administration of 17 cc Prohance contrast agent. FINDINGS:  Image quality is degraded due to repetitive patient motion, but remains  diagnostic utility. . Dedicated slices through the mesial temporal lobes as well  as thin postcontrast imaging were not able to be performed. No evidence of acute or recent infarct. No evidence of recent hemorrhage. Normal  cerebral parenchyma signal for age. Normal vascular flow voids. Normal size of  ventricles and extra-axial spaces for age. No abnormal contrast enhancement. No calvarial abnormality is seen. Grossly normal orbital structures. Essentially  clear paranasal sinuses and mastoid air cells. No abnormality of extracranial  soft tissues. Impression Age-appropriate MR brain without acute abnormality.                    Signed By: Kary Jaramillo MD     April 9, 2021

## 2021-04-10 LAB
ANION GAP SERPL CALC-SCNC: 5 MMOL/L (ref 7–16)
BASOPHILS # BLD: 0 K/UL (ref 0–0.2)
BASOPHILS NFR BLD: 0 % (ref 0–2)
BUN SERPL-MCNC: 14 MG/DL (ref 6–23)
CALCIUM SERPL-MCNC: 8.9 MG/DL (ref 8.3–10.4)
CHLORIDE SERPL-SCNC: 107 MMOL/L (ref 98–107)
CO2 SERPL-SCNC: 27 MMOL/L (ref 21–32)
CREAT SERPL-MCNC: 0.66 MG/DL (ref 0.6–1)
DIFFERENTIAL METHOD BLD: ABNORMAL
EOSINOPHIL # BLD: 0.7 K/UL (ref 0–0.8)
EOSINOPHIL NFR BLD: 7 % (ref 0.5–7.8)
ERYTHROCYTE [DISTWIDTH] IN BLOOD BY AUTOMATED COUNT: 12.7 % (ref 11.9–14.6)
GLUCOSE SERPL-MCNC: 108 MG/DL (ref 65–100)
HCT VFR BLD AUTO: 32.2 % (ref 35.8–46.3)
HGB BLD-MCNC: 10.9 G/DL (ref 11.7–15.4)
IMM GRANULOCYTES # BLD AUTO: 0.1 K/UL (ref 0–0.5)
IMM GRANULOCYTES NFR BLD AUTO: 1 % (ref 0–5)
LYMPHOCYTES # BLD: 3.1 K/UL (ref 0.5–4.6)
LYMPHOCYTES NFR BLD: 28 % (ref 13–44)
MCH RBC QN AUTO: 31.9 PG (ref 26.1–32.9)
MCHC RBC AUTO-ENTMCNC: 33.9 G/DL (ref 31.4–35)
MCV RBC AUTO: 94.2 FL (ref 79.6–97.8)
MONOCYTES # BLD: 1.3 K/UL (ref 0.1–1.3)
MONOCYTES NFR BLD: 12 % (ref 4–12)
NEUTS SEG # BLD: 5.9 K/UL (ref 1.7–8.2)
NEUTS SEG NFR BLD: 53 % (ref 43–78)
NRBC # BLD: 0 K/UL (ref 0–0.2)
PLATELET # BLD AUTO: 230 K/UL (ref 150–450)
PMV BLD AUTO: 9 FL (ref 9.4–12.3)
POTASSIUM SERPL-SCNC: 3.9 MMOL/L (ref 3.5–5.1)
RBC # BLD AUTO: 3.42 M/UL (ref 4.05–5.2)
SODIUM SERPL-SCNC: 139 MMOL/L (ref 136–145)
WBC # BLD AUTO: 11.1 K/UL (ref 4.3–11.1)

## 2021-04-10 PROCEDURE — 74011250637 HC RX REV CODE- 250/637: Performed by: STUDENT IN AN ORGANIZED HEALTH CARE EDUCATION/TRAINING PROGRAM

## 2021-04-10 PROCEDURE — 80048 BASIC METABOLIC PNL TOTAL CA: CPT

## 2021-04-10 PROCEDURE — 36415 COLL VENOUS BLD VENIPUNCTURE: CPT

## 2021-04-10 PROCEDURE — 99232 SBSQ HOSP IP/OBS MODERATE 35: CPT | Performed by: NURSE PRACTITIONER

## 2021-04-10 PROCEDURE — 74011000258 HC RX REV CODE- 258: Performed by: STUDENT IN AN ORGANIZED HEALTH CARE EDUCATION/TRAINING PROGRAM

## 2021-04-10 PROCEDURE — 85025 COMPLETE CBC W/AUTO DIFF WBC: CPT

## 2021-04-10 PROCEDURE — 74011250636 HC RX REV CODE- 250/636: Performed by: STUDENT IN AN ORGANIZED HEALTH CARE EDUCATION/TRAINING PROGRAM

## 2021-04-10 PROCEDURE — 97530 THERAPEUTIC ACTIVITIES: CPT

## 2021-04-10 PROCEDURE — 65270000029 HC RM PRIVATE

## 2021-04-10 RX ORDER — QUETIAPINE FUMARATE 25 MG/1
25 TABLET, FILM COATED ORAL
Status: DISCONTINUED | OUTPATIENT
Start: 2021-04-10 | End: 2021-04-11 | Stop reason: HOSPADM

## 2021-04-10 RX ORDER — DOCUSATE SODIUM 50 MG/5ML
100 LIQUID ORAL DAILY
Status: DISCONTINUED | OUTPATIENT
Start: 2021-04-10 | End: 2021-04-11 | Stop reason: HOSPADM

## 2021-04-10 RX ADMIN — LORAZEPAM 0.5 MG: 0.5 TABLET ORAL at 17:00

## 2021-04-10 RX ADMIN — MEMANTINE HYDROCHLORIDE 10 MG: 5 TABLET ORAL at 09:32

## 2021-04-10 RX ADMIN — MEMANTINE HYDROCHLORIDE 10 MG: 5 TABLET ORAL at 17:00

## 2021-04-10 RX ADMIN — LEVETIRACETAM 500 MG: 100 INJECTION, SOLUTION INTRAVENOUS at 00:52

## 2021-04-10 RX ADMIN — Medication 10 ML: at 16:02

## 2021-04-10 RX ADMIN — AMLODIPINE BESYLATE 10 MG: 10 TABLET ORAL at 09:32

## 2021-04-10 RX ADMIN — PROPRANOLOL HYDROCHLORIDE 20 MG: 10 TABLET ORAL at 17:00

## 2021-04-10 RX ADMIN — ESCITALOPRAM OXALATE 10 MG: 10 TABLET ORAL at 09:32

## 2021-04-10 RX ADMIN — Medication 10 ML: at 05:42

## 2021-04-10 RX ADMIN — FAMOTIDINE 20 MG: 20 TABLET ORAL at 17:00

## 2021-04-10 RX ADMIN — DOCUSATE SODIUM 100 MG: 50 LIQUID ORAL at 12:00

## 2021-04-10 RX ADMIN — QUETIAPINE FUMARATE 25 MG: 25 TABLET ORAL at 21:47

## 2021-04-10 RX ADMIN — DONEPEZIL HYDROCHLORIDE 10 MG: 5 TABLET, FILM COATED ORAL at 21:47

## 2021-04-10 RX ADMIN — POLYETHYLENE GLYCOL 3350 17 G: 17 POWDER, FOR SOLUTION ORAL at 09:31

## 2021-04-10 RX ADMIN — FAMOTIDINE 20 MG: 20 TABLET ORAL at 09:32

## 2021-04-10 RX ADMIN — PROPRANOLOL HYDROCHLORIDE 20 MG: 10 TABLET ORAL at 09:32

## 2021-04-10 RX ADMIN — Medication 10 ML: at 21:47

## 2021-04-10 RX ADMIN — METOPROLOL SUCCINATE 50 MG: 50 TABLET, EXTENDED RELEASE ORAL at 09:32

## 2021-04-10 RX ADMIN — LORAZEPAM 0.5 MG: 0.5 TABLET ORAL at 09:32

## 2021-04-10 NOTE — PROGRESS NOTES
[facility] Hospitalist Progress Note     Name:  Migdalia Stewart  Age:60 y.o. Sex:female   :  1960    MRN:  [id number]     Admit Date:  2021    Reason for Admission:  Seizure [facility]) [R56.9]    Hospital Course/Interval history:     Migdalia Stewart is a 64 y.o. female with medical history of early onset Alzheimer's dementia, hypertension, chronic benzo use, who presented to ED with seizure. Patient altered and likely postictal my examination the ED, Gilberto history obtained from  at bedside.  states that he put patient to bed last night in her normal state of health without any acute issues. She woke up in walk downstairs, which is not unusual for her with her underlying dementia.  stayed in bed with her for some time after, and subsequently found her to be convulsing, with whole body shaking. He does not know if she lost any urine or stool, but thinks that she may have had some urinary incontinence. No evidence of tongue biting. Of note, patient is on Ativan 1 mg 3 times daily for her anxiety and agitation. Yesterday  noticed that she had ran out and she did not receive any Ativan yesterday.  denies any complaints at home, although patient mostly confused at baseline and does not make much sense with her words. Denies any fevers, chills, pain, dysuria, urinary issues, diarrhea, cough or congestion. He denies any previous personal or family history of seizure disorder. In the ED, patient again had seizure activity with whole body convulsing. Her seizures aborted with Ativan administration. She was post-ictal in the ED, confused and not making any sense with her words. Labs positive for mild UTI and IGNACIO.        Assessment & Plan     #Seizures  - s/p Ativan IV in ED for seizure activity  - likely from benzo withdrawal, UTI but may be at risk with underlying dementia  - cont on Keppra IV BID  - will cont with ativan 0.5mg BID at lower dose (takes 1mg TID) to prevent possible withdrawal seizures  - brain MRI negative for infarct or other abnormality  - EEG on admission with oddly abnormal EEG with diffuse slowing consistent with Alzheimer's disease  - ativan IV prn for seizure activity  - consulted neurology for assistance, follows Dr. Eduarda Cotton with neurology in outpatient setting    4/9: Neurology recommending repeat EEG today and will determine further AED needs pending this. Cont with keppra for now  4/10: Repeat EEG improved from prior. Neurology stopped Keppra, as this is 's preference as well. #Acute Hypoxic Respiratory Failure  - still on 2L NC  - CXR clear on admission, likely atelectasis  - stop IVF if contributing  - encourage incentive spirometry use     #UTI  - UA mildly positive  - UCx with GNR, await speciation and specificity  - cont on Rocephin for now     #IGNACIO  - Cr 1.09 on admission  - improved to 0.74 on IVF, stop IVF  - recheck in AM     #Alzheimers Dementia  - cont home namenda and aricept  - hold home seroquel with patient not back to neurologic baseline  - cont home lexapro qam as patient takes in morning     #HTN/tremor  - cont home toprol and propanolol  - started on norvasc for better BP control, which has improved      Diet:  DIET REGULAR  DVT PPx: hold Lovenox with severe bruising  Code status: Full Code  Disposition/Expected LOS: planned for discharge today, however PT cannot even stand on feet and  worried about her safety at home. I am in agreement to keep her here overnight and have PT work with her today. Pt unavailable to work with her today. Discussed with case management and PT. Surrogate decision-maker:  at bedside, Jose Martin Сергей, [phone]        Subjective (04/10/21):    Seen and examined at bedside this morning. No acute events overnight.  at bedside states that she did not have any seizure-like activity per night, but is still not back to her neurological baseline.   Patient with likely prolonged postictal state.  worried about his ability to care for patient on home as she cannot even walk after a lot of support standing up at bedside. Review of Systems: 14 point review of systems is otherwise negative with the exception of the elements mentioned above. Objective:     Patient Vitals for the past 24 hrs:   Temp Pulse Resp BP SpO2   04/10/21 1135 99 °F (37.2 °C) 73 16 118/76 94 %   04/10/21 0742 98.8 °F (37.1 °C) 74 16 (!) 145/93 97 %   04/10/21 0528 98.9 °F (37.2 °C) 68 16 133/82 94 %   04/09/21 2338 98.5 °F (36.9 °C) 68 18 123/85 96 %   04/09/21 2057 98.5 °F (36.9 °C) 75 16 132/81 97 %   04/09/21 1640 98.7 °F (37.1 °C) 73 16 131/82 96 %   04/09/21 1600 98.7 °F (37.1 °C) 73 16 131/82 96 %     Oxygen Therapy  O2 Sat (%): 94 % (04/10/21 1135)  Pulse via Oximetry: 68 beats per minute (04/08/21 1107)  O2 Device: Nasal cannula (04/08/21 1107)  O2 Flow Rate (L/min): 2 l/min (04/08/21 1107)    Body mass index is 28.19 kg/m².     Physical Exam:   General:  No acute distress, speaking in one word sentences, no use of accessory muscles   Lungs:  Clear to auscultation bilaterally   CV:  Regular rate and rhythm with normal S1 and S2   Abdomen:  Soft, nontender, nondistended, normoactive bowel sounds   Extremities:  No cyanosis clubbing or edema   Neuro:  Some generalized weakness, improved and able to squeee by fingers with both upper extremities, wiggles both lower ext digits  Psych:  Normal affect, pleasantly confused    Data Review:  I have reviewed all labs, meds, and studies from the last 24 hours:    Labs:    Recent Results (from the past 24 hour(s))   CBC WITH AUTOMATED DIFF    Collection Time: 04/10/21  6:10 AM   Result Value Ref Range    WBC 11.1 4.3 - 11.1 K/uL    RBC 3.42 (L) 4.05 - 5.2 M/uL    HGB 10.9 (L) 11.7 - 15.4 g/dL    HCT 32.2 (L) 35.8 - 46.3 %    MCV 94.2 79.6 - 97.8 FL    MCH 31.9 26.1 - 32.9 PG    MCHC 33.9 31.4 - 35.0 g/dL    RDW 12.7 11.9 - 14.6 %    PLATELET 527 158 - 730 K/uL    MPV 9.0 (L) 9.4 - 12.3 FL    ABSOLUTE NRBC 0.00 0.0 - 0.2 K/uL    DF AUTOMATED      NEUTROPHILS 53 43 - 78 %    LYMPHOCYTES 28 13 - 44 %    MONOCYTES 12 4.0 - 12.0 %    EOSINOPHILS 7 0.5 - 7.8 %    BASOPHILS 0 0.0 - 2.0 %    IMMATURE GRANULOCYTES 1 0.0 - 5.0 %    ABS. NEUTROPHILS 5.9 1.7 - 8.2 K/UL    ABS. LYMPHOCYTES 3.1 0.5 - 4.6 K/UL    ABS. MONOCYTES 1.3 0.1 - 1.3 K/UL    ABS. EOSINOPHILS 0.7 0.0 - 0.8 K/UL    ABS. BASOPHILS 0.0 0.0 - 0.2 K/UL    ABS. IMM. GRANS. 0.1 0.0 - 0.5 K/UL   METABOLIC PANEL, BASIC    Collection Time: 04/10/21  6:10 AM   Result Value Ref Range    Sodium 139 136 - 145 mmol/L    Potassium 3.9 3.5 - 5.1 mmol/L    Chloride 107 98 - 107 mmol/L    CO2 27 21 - 32 mmol/L    Anion gap 5 (L) 7 - 16 mmol/L    Glucose 108 (H) 65 - 100 mg/dL    BUN 14 6 - 23 MG/DL    Creatinine 0.66 0.6 - 1.0 MG/DL    GFR est AA >60 >60 ml/min/1.73m2    GFR est non-AA >60 >60 ml/min/1.73m2    Calcium 8.9 8.3 - 10.4 MG/DL       All Micro Results     Procedure Component Value Units Date/Time    CULTURE, URINE [[id number]]  (Abnormal) Collected: 04/07/21 1200    Order Status: Completed Specimen: Urine from Clean catch Updated: 04/10/21 0972     Special Requests: NO SPECIAL REQUESTS        Culture result:       >100,000 COLONIES/mL GRAM NEGATIVE RODS                  <10,000 COLONIES/mL MIXED SKIN MUSA ISOLATED            REPEATING IDENTIFICATION AND SUSCEPTIBILITY TO FOLLOW          EKG Results     Procedure 720 Value Units Date/Time    EKG, 12 LEAD, INITIAL [[id number]] Collected: 04/07/21 0054    Order Status: Completed Updated: 04/07/21 1158     Ventricular Rate 64 BPM      Atrial Rate 64 BPM      P-R Interval 172 ms      QRS Duration 98 ms      Q-T Interval 428 ms      QTC Calculation (Bezet) 441 ms      Calculated P Axis 38 degrees      Calculated R Axis 67 degrees      Calculated T Axis 83 degrees      Diagnosis --     !! AGE AND GENDER SPECIFIC ECG ANALYSIS !!   Normal sinus rhythm  Nonspecific T wave abnormality  Abnormal ECG  When compared with ECG of 13-SEP-2000 06:57,  Vent. rate has decreased BY  42 BPM    Confirmed by ST RAIMUNDO ADKINS MD (), FRANCES ELIZABETH ([id number]) on 4/7/2021 11:58:36 AM            Other Studies:  Duplex Upper Ext Venous Bilat    Result Date: 4/9/2021  Bilateral upper extremity ultrasound. Clinical history: Swelling, pain TECHNIQUE: Grayscale and duplex ultrasound of the right and left upper extremities. FINDINGS: The internal jugular and innominate veins are patent and demonstrate color Doppler flow, bilaterally. The subclavian veins are patent and demonstrate normal spectral waveform. The axillary and brachial veins are patent and demonstrate color Doppler flow. The basilic and the cephalic veins are patent. The radial and ulnar veins are patent. 1. No evidence of deep vein thrombosis.       Current Meds:   Current Facility-Administered Medications   Medication Dose Route Frequency    docusate (COLACE) 50 mg/5 mL oral liquid 100 mg  100 mg Oral DAILY    amLODIPine (NORVASC) tablet 10 mg  10 mg Oral DAILY    escitalopram oxalate (LEXAPRO) tablet 10 mg  10 mg Oral DAILY    polyethylene glycol (MIRALAX) packet 17 g  17 g Oral DAILY    donepeziL (ARICEPT) tablet 10 mg  10 mg Oral DAILY    memantine (NAMENDA) tablet 10 mg  10 mg Oral BID    sodium chloride (NS) flush 5-40 mL  5-40 mL IntraVENous Q8H    sodium chloride (NS) flush 5-40 mL  5-40 mL IntraVENous PRN    acetaminophen (TYLENOL) tablet 650 mg  650 mg Oral Q6H PRN    Or    acetaminophen (TYLENOL) suppository 650 mg  650 mg Rectal Q6H PRN    polyethylene glycol (MIRALAX) packet 17 g  17 g Oral DAILY PRN    bisacodyL (DULCOLAX) suppository 10 mg  10 mg Rectal DAILY PRN    promethazine (PHENERGAN) tablet 25 mg  25 mg Oral Q6H PRN    Or    ondansetron (ZOFRAN) injection 4 mg  4 mg IntraVENous Q6H PRN    famotidine (PEPCID) tablet 20 mg  20 mg Oral BID PRN    LORazepam (ATIVAN) tablet 0.5 mg  0.5 mg Oral BID    metoprolol succinate (TOPROL-XL) XL tablet 50 mg  50 mg Oral DAILY    propranoloL (INDERAL) tablet 20 mg  20 mg Oral BID    LORazepam (ATIVAN) injection 2 mg  2 mg IntraVENous Q2H PRN    famotidine (PEPCID) tablet 20 mg  20 mg Oral BID       Problem List:  Hospital Problems as of 4/10/2021 Date Reviewed: 4/7/2021          Codes Class Noted - Resolved POA    Seizure ([facility] ICD-10-CM: R56.9  ICD-9-CM: 780.39  4/7/2021 - Present Unknown        Early onset Alzheimer's dementia without behavioral disturbance ([facility].) ICD-10-CM: G30.0, F02.80  ICD-9-CM: 331.0, 294.10  6/8/2017 - Present Yes        Benign essential HTN ICD-10-CM: I10  ICD-9-CM: 401.1  11/18/2015 - Present Yes               Part of this note was written by using a voice dictation software and the note has been proof read but may still contain some grammatical/other typographical errors.     Signed By: Eric oRse MD   [employer] [facility]    April 10, 2021  5:15 PM

## 2021-04-10 NOTE — PROGRESS NOTES
ACUTE PHYSICAL THERAPY GOALS:  (Developed with and agreed upon by patient and/or caregiver.)  1. Patient will perform bed mobility with SUPERVISION within 7 days. 2. Patient will transfer bed to chair with SUPERVISION within 7 days. 3. Patient will demonstrate FAIR+ DYNAMIC STANDING balance within 7 day(s). 4. Patient will ambulate 150ft+ with STAND BY ASSISTANCE within 7 days. 5. Patient will tolerate 25+ minutes of therapeutic activity/exercise and/or neuromuscular re-education while maintaining stable vitals to improve functional strength and activity tolerance within 7 days. PHYSICAL THERAPY: Daily Note Treatment Day # 2    Mary Beth Robison is a 64 y.o. female   PRIMARY DIAGNOSIS: <principal problem not specified>  [facility] [R56.9]         ASSESSMENT:     REHAB RECOMMENDATIONS: CURRENT LEVEL OF FUNCTION:  (Most Recently Demonstrated)   Recommendation to date pending progress:  Setting:   Short-term Rehab versus Home Health (per family preference)   Equipment:    Rolling Walker Bed Mobility:   Maximal Assistance  Sit to Stand:   Maximal Assistance  Transfers:   Maximal Assistance  Gait/Mobility:   Minimal Assistance     ASSESSMENT:  Ms. Leonidas Silver is a 64year old female admitted with acute onset seizure. Patient is seen this PM for second PT treatment. Of note, patient with early onset Alzheimer's dementia; Oriented x1-2 at baseline; oriented x1-2; spouse at bedside provides history. At baseline, Ms. Leonidas Silver ambulates household level distances without utilizing an assistive device and requires assistance from spouse and in home caregiver for all ADLs; able to self feed with set up and cueing. Patient requires cues throughout mobility as well as supervision secondary to cognitive status.  Today, presents with post ictal effect with decreased cognition and motor response with generalized weakness in B LEs, R LE tone with hyperreflexia with decreased AROM in proximal B UEs, and decreased balance/gait status from baseline. Required maximal assistance x2 for bed mobility and MAX A x 2 to standing then three steps to the right side with a two wheel rolling walker after second attempt. Then seated and return to supine MAX X 2 and then to bed to chair position. Patient is currently functioning below her baseline with regards to transfers and ambulation. Anticipate once post ictal stupor improves, mobility will improve as well. Recommend HHPT at discharge versus short term rehab. Will follow with stated plan of care.     Of note in addition to observed during initial evaluation during the second visit, resolving bruising appreciated to patient's proximal B UEs. Patient c/o pain with AROM and PROM of proximal B UEs. Does not appear to be in pain at rest.       SUBJECTIVE:   Ms. Sharp Ishmael states, \"What would you like? .\"    SOCIAL HISTORY/ LIVING ENVIRONMENT:  Home Environment: Private residence  One/Two Story Residence: Two story  Living Alone: No  Support Systems: Family member(s), Spouse/Significant Other/Partner  OBJECTIVE:     PAIN: VITAL SIGNS: LINES/DRAINS:   Pre Treatment: Pain Screen  Pain Scale 1: Numeric (0 - 10)  Pain Intensity 1: 0  Post Treatment: 0/10 no pain reported.     IV  O2 Device: Nasal cannula     MOBILITY: I Mod I S SBA CGA Min Mod Max Total  NT x2 Comments:   Bed Mobility    Rolling [] [] [] [] [] [] [] [x] [] [] [x]    Supine to Sit [] [] [] [] [] [] [] [x] [] [] [x]    Scooting [] [] [] [] [] [] [] [x] [] [] [x]    Sit to Supine [] [] [] [] [] [] [] [x] [] [] [x]    Transfers    Sit to Stand [] [] [] [] [] [] [] [x] [] [] [x]    Bed to Chair [] [] [] [] [] [] [] [] [] [x] []    Stand to Sit [] [] [] [] [] [] [] [x] [] [] [x]    I=Independent, Mod I=Modified Independent, S=Supervision, SBA=Standby Assistance, CGA=Contact Guard Assistance,   Min=Minimal Assistance, Mod=Moderate Assistance, Max=Maximal Assistance, Total=Total Assistance, NT=Not Tested    BALANCE: Good Fair+ Fair Fair- Poor NT Comments   Sitting Static [] [x] [] [] [] []    Sitting Dynamic [] [x] [] [] [] []              Standing Static [] [] [x] [] [] []    Standing Dynamic [] [] [] [x] [] []      GAIT: I Mod I S SBA CGA Min Mod Max Total  NT x2 Comments:   Level of Assistance [] [] [] [] [] [x] [] [] [] [] [x]    Distance 3 steps to the right     DME Rolling Walker    Gait Quality Poor to fair     Weightbearing  Status WBAT     I=Independent, Mod I=Modified Independent, S=Supervision, SBA=Standby Assistance, CGA=Contact Guard Assistance,   Min=Minimal Assistance, Mod=Moderate Assistance, Max=Maximal Assistance, Total=Total Assistance, NT=Not Tested    PLAN:   FREQUENCY/DURATION: PT Plan of Care: 3 times/week for duration of hospital stay or until stated goals are met, whichever comes first.  TREATMENT:     TREATMENT:   ($$ Therapeutic Activity: 23-37 mins    )  Therapeutic Activity (25 Minutes): Therapeutic activity included Rolling, Supine to Sit, Sit to Supine, Scooting, Lateral Scooting, Transfer Training, Ambulation on level ground, Sitting balance  and Standing balance to improve functional Mobility, Strength, ROM and Activity tolerance.     TREATMENT GRID:   Date:   Date:   Date:     Activity/Exercise Parameters Parameters Parameters                                                 AFTER TREATMENT POSITION/PRECAUTIONS:  Bed and RN notified    INTERDISCIPLINARY COLLABORATION:  RN/PCT    TOTAL TREATMENT DURATION:  PT Patient Time In/Time Out  Time In: 1530  Time Out: 181 Nikki Lin, PT

## 2021-04-10 NOTE — PROGRESS NOTES
Problem: Falls - Risk of  Goal: *Absence of Falls  Description: Document Socorro Sethi Fall Risk and appropriate interventions in the flowsheet. Outcome: Progressing Towards Goal  Note: Fall Risk Interventions:  Mobility Interventions: Patient to call before getting OOB, OT consult for ADLs, PT Consult for mobility concerns, PT Consult for assist device competence    Mentation Interventions: Adequate sleep, hydration, pain control    Medication Interventions: Assess postural VS orthostatic hypotension    Elimination Interventions: Call light in reach, Bed/chair exit alarm, Patient to call for help with toileting needs, Stay With Me (per policy), Toilet paper/wipes in reach, Toileting schedule/hourly rounds              Problem: Patient Education: Go to Patient Education Activity  Goal: Patient/Family Education  Outcome: Progressing Towards Goal     Problem: Pressure Injury - Risk of  Goal: *Prevention of pressure injury  Description: Document Ashwin Scale and appropriate interventions in the flowsheet.   Outcome: Progressing Towards Goal  Note: Pressure Injury Interventions:  Sensory Interventions: Assess changes in LOC    Moisture Interventions: Absorbent underpads    Activity Interventions: Increase time out of bed    Mobility Interventions: HOB 30 degrees or less    Nutrition Interventions: Document food/fluid/supplement intake                     Problem: Patient Education: Go to Patient Education Activity  Goal: Patient/Family Education  Outcome: Progressing Towards Goal     Problem: Patient Education: Go to Patient Education Activity  Goal: Patient/Family Education  Outcome: Progressing Towards Goal     Problem: Patient Education: Go to Patient Education Activity  Goal: Patient/Family Education  Outcome: Progressing Towards Goal

## 2021-04-10 NOTE — PROGRESS NOTES
Neurology Daily Progress Note     Assessment:     64year old female being seen for seizure, likely provoked in the setting of benzodiazepine withdrawal, UTI. Hx of advanced Alzheimer's disease with early onset. Given the circumstances of the seizure would probably not initiate antiepileptic drug at this time, unless she has a recurrence without the intervening variable of benzodiazepine withdrawal. Repeat EEG showed diffuse slowing, negative for seizure. No additional neurological workup is needed at this time. Will sign off. Plan:     Continue Ativan 0.5 mg every 12 hours. Unless there is a clearly nonprovoked seizure, recommend not treating and would discontinue Keppra. Patient's  strongly would prefer that she not be treated with Keppra unless it is absolutely necessary. It is possible that Keppra is contributing to her current condition    Follow up with Dr. Cat Palm with [facility] after discharge. Subjective: Interval history:    Alert, oriented to self only. Able to move all extremities spontaneously. Follows commands intermittently. No seizure activity over night. Endorses pain with PROM in BUE. Repeat EEG showed diffuse slowing, negative for seizure. History:    Xiomy Be is a 64 y.o. female who is being seen for seizure. Review of systems negative with exception of pertinent positives and negatives noted above.        Objective:     Vitals:    04/09/21 2057 04/09/21 2338 04/10/21 0528 04/10/21 0742   BP: 132/81 123/85 133/82 (!) 145/93   Pulse: 75 68 68 74   Resp: 16 18 16 16   Temp: 98.5 °F (36.9 °C) 98.5 °F (36.9 °C) 98.9 °F (37.2 °C) 98.8 °F (37.1 °C)   SpO2: 97% 96% 94% 97%   Weight:       Height:              Current Facility-Administered Medications:     docusate (COLACE) 50 mg/5 mL oral liquid 100 mg, 100 mg, Oral, DAILY, Abdiel Howard MD    amLODIPine (NORVASC) tablet 10 mg, 10 mg, Oral, DAILY, Abdiel Howard MD, 10 mg at 04/10/21 0971   escitalopram oxalate (LEXAPRO) tablet 10 mg, 10 mg, Oral, DAILY, Micaela Melo MD, 10 mg at 04/10/21 0932    polyethylene glycol (MIRALAX) packet 17 g, 17 g, Oral, DAILY, Micaela Melo MD, 17 g at 04/10/21 0931    donepeziL (ARICEPT) tablet 10 mg, 10 mg, Oral, DAILY, Micaela Melo MD, 10 mg at 04/09/21 2319    memantine (NAMENDA) tablet 10 mg, 10 mg, Oral, BID, Micaela Melo MD, 10 mg at 04/10/21 0932    sodium chloride (NS) flush 5-40 mL, 5-40 mL, IntraVENous, Q8H, Micaela Melo MD, 10 mL at 04/10/21 0542    sodium chloride (NS) flush 5-40 mL, 5-40 mL, IntraVENous, PRN, Micaela Melo MD    acetaminophen (TYLENOL) tablet 650 mg, 650 mg, Oral, Q6H PRN **OR** acetaminophen (TYLENOL) suppository 650 mg, 650 mg, Rectal, Q6H PRN, Micaela Melo MD    polyethylene glycol (MIRALAX) packet 17 g, 17 g, Oral, DAILY PRN, Micaela Melo MD, 17 g at 04/09/21 1700    bisacodyL (DULCOLAX) suppository 10 mg, 10 mg, Rectal, DAILY PRN, Micaela Melo MD    promethazine (PHENERGAN) tablet 25 mg, 25 mg, Oral, Q6H PRN **OR** ondansetron (ZOFRAN) injection 4 mg, 4 mg, IntraVENous, Q6H PRN, Micaela Melo MD    famotidine (PEPCID) tablet 20 mg, 20 mg, Oral, BID PRN, Micaela Melo MD    levETIRAcetam (KEPPRA) 500 mg in 0.9% sodium chloride (MBP/ADV) 100 mL MBP, 500 mg, IntraVENous, Q12H, Micaela Melo MD, 500 mg at 04/10/21 0052    LORazepam (ATIVAN) tablet 0.5 mg, 0.5 mg, Oral, BID, Micaela Melo MD, 0.5 mg at 04/10/21 0932    metoprolol succinate (TOPROL-XL) XL tablet 50 mg, 50 mg, Oral, DAILY, Micaela Melo MD, 50 mg at 04/10/21 0932    propranoloL (INDERAL) tablet 20 mg, 20 mg, Oral, BID, Micaela Melo MD, 20 mg at 04/10/21 0932    LORazepam (ATIVAN) injection 2 mg, 2 mg, IntraVENous, Q2H PRN, Micaela Melo MD, 2 mg at 04/07/21 1412    famotidine (PEPCID) tablet 20 mg, 20 mg, Oral, BID, Micaela Melo MD, 20 mg at 04/10/21 0932    Recent Results (from the past 12 hour(s))   CBC WITH AUTOMATED DIFF    Collection Time: 04/10/21  6:10 AM   Result Value Ref Range    WBC 11.1 4.3 - 11.1 K/uL    RBC 3.42 (L) 4.05 - 5.2 M/uL    HGB 10.9 (L) 11.7 - 15.4 g/dL    HCT 32.2 (L) 35.8 - 46.3 %    MCV 94.2 79.6 - 97.8 FL    MCH 31.9 26.1 - 32.9 PG    MCHC 33.9 31.4 - 35.0 g/dL    RDW 12.7 11.9 - 14.6 %    PLATELET 282 805 - 219 K/uL    MPV 9.0 (L) 9.4 - 12.3 FL    ABSOLUTE NRBC 0.00 0.0 - 0.2 K/uL    DF AUTOMATED      NEUTROPHILS 53 43 - 78 %    LYMPHOCYTES 28 13 - 44 %    MONOCYTES 12 4.0 - 12.0 %    EOSINOPHILS 7 0.5 - 7.8 %    BASOPHILS 0 0.0 - 2.0 %    IMMATURE GRANULOCYTES 1 0.0 - 5.0 %    ABS. NEUTROPHILS 5.9 1.7 - 8.2 K/UL    ABS. LYMPHOCYTES 3.1 0.5 - 4.6 K/UL    ABS. MONOCYTES 1.3 0.1 - 1.3 K/UL    ABS. EOSINOPHILS 0.7 0.0 - 0.8 K/UL    ABS. BASOPHILS 0.0 0.0 - 0.2 K/UL    ABS. IMM. GRANS. 0.1 0.0 - 0.5 K/UL   METABOLIC PANEL, BASIC    Collection Time: 04/10/21  6:10 AM   Result Value Ref Range    Sodium 139 136 - 145 mmol/L    Potassium 3.9 3.5 - 5.1 mmol/L    Chloride 107 98 - 107 mmol/L    CO2 27 21 - 32 mmol/L    Anion gap 5 (L) 7 - 16 mmol/L    Glucose 108 (H) 65 - 100 mg/dL    BUN 14 6 - 23 MG/DL    Creatinine 0.66 0.6 - 1.0 MG/DL    GFR est AA >60 >60 ml/min/1.73m2    GFR est non-AA >60 >60 ml/min/1.73m2    Calcium 8.9 8.3 - 10.4 MG/DL     MRI Results (most recent):  Results from Hospital Encounter encounter on 04/07/21   MRI BRAIN W WO CONT    Narrative EXAMINATION: BRAIN MRI 4/7/2021 4:20 PM    INDICATION: Seizure    COMPARISON: CT head April 7, 2021    TECHNIQUE: Multiplanar multisequence MRI of the brain without and with  intravenous administration of 17 cc Prohance contrast agent. FINDINGS:  Image quality is degraded due to repetitive patient motion, but remains  diagnostic utility. . Dedicated slices through the mesial temporal lobes as well  as thin postcontrast imaging were not able to be performed. No evidence of acute or recent infarct. No evidence of recent hemorrhage. Normal  cerebral parenchyma signal for age. Normal vascular flow voids. Normal size of  ventricles and extra-axial spaces for age. No abnormal contrast enhancement. No calvarial abnormality is seen. Grossly normal orbital structures. Essentially  clear paranasal sinuses and mastoid air cells. No abnormality of extracranial  soft tissues. Impression Age-appropriate MR brain without acute abnormality. Physical Exam:  General - Well developed, well nourished, in no apparent distress. Pleasant and conversent. HEENT - Normocephalic, atraumatic. Conjunctiva are clear. Neck - Supple without masses  Cardiovascular - Regular rate and rhythm. Normal S1, S2 without murmurs, rubs, or gallops. Lungs - Clear to auscultation. Abdomen - Soft, nontender with normal bowel sounds. Extremities - Peripheral pulses intact. Ecchymosis to bilateral proximal upper extremities with 1+ edema. No rashes. Neurological examination - Limited due to AMS. Alert and oriented to person only. Comprehension, attention, memory and reasoning are impaired. Language and speech are normal.   On cranial nerve examination, (II, III, IV, VI) pupils are equal, round, and reactive to light. Visual acuity is adequate. Visual fields are full to finger confrontation. Extraocular motility is normal. (V, VII) Face is symmetric and sensation is intact to light touch. (VIII) Hearing is intact. (IX, X) Palate and uvula elevate symmetrically. Voice is normal. (XI) Shoulder shrug is strong and equal bilaterally. (XII)Tongue is midline. Motor examination - There is normal muscle tone and bulk. Power is full throughout, moves all extremities spontaneously. Pain with PROM of BUE. Muscle stretch reflexes are 3+ throughout. Plantar response is flexor bilaterally. Unable to assess sensation, cerebellar examination or gait due to AMS.      Signed By: Radha Lester, APRN     April 10, 2021

## 2021-04-10 NOTE — PROGRESS NOTES
Problem: Falls - Risk of  Goal: *Absence of Falls  Description: Document [place] Fall Risk and appropriate interventions in the flowsheet. Outcome: Progressing Towards Goal  Note: Fall Risk Interventions:  Mobility Interventions: Bed/chair exit alarm, Patient to call before getting OOB    Mentation Interventions: Adequate sleep, hydration, pain control, Bed/chair exit alarm, Door open when patient unattended    Medication Interventions: Assess postural VS orthostatic hypotension, Bed/chair exit alarm, Patient to call before getting OOB    Elimination Interventions: Bed/chair exit alarm, Call light in reach, Patient to call for help with toileting needs              Problem: Patient Education: Go to Patient Education Activity  Goal: Patient/Family Education  Outcome: Progressing Towards Goal     Problem: Pressure Injury - Risk of  Goal: *Prevention of pressure injury  Description: Document Ashwin Scale and appropriate interventions in the flowsheet.   Outcome: Progressing Towards Goal  Note: Pressure Injury Interventions:  Sensory Interventions: Assess changes in LOC, Minimize linen layers    Moisture Interventions: Absorbent underpads, Apply protective barrier, creams and emollients    Activity Interventions: PT/OT evaluation, Pressure redistribution bed/mattress(bed type), Increase time out of bed    Mobility Interventions: HOB 30 degrees or less, Pressure redistribution bed/mattress (bed type), PT/OT evaluation    Nutrition Interventions: Document food/fluid/supplement intake

## 2021-04-11 ENCOUNTER — HOME HEALTH ADMISSION (OUTPATIENT)
Dept: HOME HEALTH SERVICES | Facility: HOME HEALTH | Age: 61
End: 2021-04-11
Payer: MEDICARE

## 2021-04-11 VITALS
RESPIRATION RATE: 16 BRPM | HEART RATE: 81 BPM | TEMPERATURE: 99 F | OXYGEN SATURATION: 90 % | HEIGHT: 67 IN | BODY MASS INDEX: 28.25 KG/M2 | WEIGHT: 180 LBS | DIASTOLIC BLOOD PRESSURE: 97 MMHG | SYSTOLIC BLOOD PRESSURE: 147 MMHG

## 2021-04-11 LAB
ANION GAP SERPL CALC-SCNC: 5 MMOL/L (ref 7–16)
BASOPHILS # BLD: 0 K/UL (ref 0–0.2)
BASOPHILS NFR BLD: 0 % (ref 0–2)
BUN SERPL-MCNC: 14 MG/DL (ref 6–23)
CALCIUM SERPL-MCNC: 9 MG/DL (ref 8.3–10.4)
CHLORIDE SERPL-SCNC: 105 MMOL/L (ref 98–107)
CO2 SERPL-SCNC: 28 MMOL/L (ref 21–32)
CREAT SERPL-MCNC: 0.72 MG/DL (ref 0.6–1)
DIFFERENTIAL METHOD BLD: ABNORMAL
EOSINOPHIL # BLD: 0.6 K/UL (ref 0–0.8)
EOSINOPHIL NFR BLD: 5 % (ref 0.5–7.8)
ERYTHROCYTE [DISTWIDTH] IN BLOOD BY AUTOMATED COUNT: 12.3 % (ref 11.9–14.6)
GLUCOSE SERPL-MCNC: 105 MG/DL (ref 65–100)
HCT VFR BLD AUTO: 34.2 % (ref 35.8–46.3)
HGB BLD-MCNC: 11.3 G/DL (ref 11.7–15.4)
IMM GRANULOCYTES # BLD AUTO: 0.1 K/UL (ref 0–0.5)
IMM GRANULOCYTES NFR BLD AUTO: 1 % (ref 0–5)
LYMPHOCYTES # BLD: 3.6 K/UL (ref 0.5–4.6)
LYMPHOCYTES NFR BLD: 31 % (ref 13–44)
MCH RBC QN AUTO: 31.4 PG (ref 26.1–32.9)
MCHC RBC AUTO-ENTMCNC: 33 G/DL (ref 31.4–35)
MCV RBC AUTO: 95 FL (ref 79.6–97.8)
MONOCYTES # BLD: 1.3 K/UL (ref 0.1–1.3)
MONOCYTES NFR BLD: 11 % (ref 4–12)
NEUTS SEG # BLD: 5.9 K/UL (ref 1.7–8.2)
NEUTS SEG NFR BLD: 51 % (ref 43–78)
NRBC # BLD: 0 K/UL (ref 0–0.2)
PLATELET # BLD AUTO: 270 K/UL (ref 150–450)
PMV BLD AUTO: 8.9 FL (ref 9.4–12.3)
POTASSIUM SERPL-SCNC: 3.8 MMOL/L (ref 3.5–5.1)
RBC # BLD AUTO: 3.6 M/UL (ref 4.05–5.2)
SODIUM SERPL-SCNC: 138 MMOL/L (ref 136–145)
WBC # BLD AUTO: 11.6 K/UL (ref 4.3–11.1)

## 2021-04-11 PROCEDURE — 85025 COMPLETE CBC W/AUTO DIFF WBC: CPT

## 2021-04-11 PROCEDURE — 36415 COLL VENOUS BLD VENIPUNCTURE: CPT

## 2021-04-11 PROCEDURE — 80048 BASIC METABOLIC PNL TOTAL CA: CPT

## 2021-04-11 PROCEDURE — 74011250637 HC RX REV CODE- 250/637: Performed by: STUDENT IN AN ORGANIZED HEALTH CARE EDUCATION/TRAINING PROGRAM

## 2021-04-11 RX ORDER — LORAZEPAM 1 MG/1
0.5 TABLET ORAL 3 TIMES DAILY
Qty: 15 TAB | Refills: 0 | Status: SHIPPED | OUTPATIENT
Start: 2021-04-11 | End: 2021-04-21

## 2021-04-11 RX ORDER — AMLODIPINE BESYLATE 5 MG/1
5 TABLET ORAL DAILY
Status: DISCONTINUED | OUTPATIENT
Start: 2021-04-11 | End: 2021-04-11

## 2021-04-11 RX ORDER — CEFPODOXIME PROXETIL 100 MG/1
100 TABLET, FILM COATED ORAL 2 TIMES DAILY
Qty: 8 TAB | Refills: 0 | Status: SHIPPED | OUTPATIENT
Start: 2021-04-11 | End: 2021-04-15

## 2021-04-11 RX ORDER — DOCUSATE SODIUM 50 MG/5ML
100 LIQUID ORAL DAILY
Qty: 300 ML | Refills: 0 | Status: SHIPPED | OUTPATIENT
Start: 2021-04-12 | End: 2021-05-12

## 2021-04-11 RX ADMIN — ESCITALOPRAM OXALATE 10 MG: 10 TABLET ORAL at 09:02

## 2021-04-11 RX ADMIN — MEMANTINE HYDROCHLORIDE 10 MG: 5 TABLET ORAL at 09:02

## 2021-04-11 RX ADMIN — METOPROLOL SUCCINATE 50 MG: 50 TABLET, EXTENDED RELEASE ORAL at 09:02

## 2021-04-11 RX ADMIN — PROPRANOLOL HYDROCHLORIDE 20 MG: 10 TABLET ORAL at 09:02

## 2021-04-11 RX ADMIN — POLYETHYLENE GLYCOL 3350 17 G: 17 POWDER, FOR SOLUTION ORAL at 09:02

## 2021-04-11 RX ADMIN — LORAZEPAM 0.5 MG: 0.5 TABLET ORAL at 09:02

## 2021-04-11 RX ADMIN — Medication 10 ML: at 06:10

## 2021-04-11 RX ADMIN — FAMOTIDINE 20 MG: 20 TABLET ORAL at 09:02

## 2021-04-11 RX ADMIN — DOCUSATE SODIUM 100 MG: 50 LIQUID ORAL at 09:00

## 2021-04-11 NOTE — DISCHARGE SUMMARY
Андрей [facility] Hospitalist Discharge Summary     Name:  Asya Pinto    Age:60 y.o. Sex:female  :  1960       MRN:  [id number]       Admitting Physician: Arnulfo Terrazas MD Admit Date: 2021 12:33 AM   Attending Physician: Rose Alpers, MD  Primary Care Physician: Jodene Soulier., MD       Discharge Physician: Arnulfo Terrazas MD  Discharge date: 21   Discharged Condition: Stable    Indication for Admission:   Chief Complaint   Patient presents with    Altered mental status        Reasons for hospitalization:  Hospital Problems as of 2021 Date Reviewed: 2021          Codes Class Noted - Resolved POA    Seizure ([facility] ICD-10-CM: R56.9  ICD-9-CM: 780.39  2021 - Present Unknown        Early onset Alzheimer's dementia without behavioral disturbance ([facility] ICD-10-CM: G30.0, F02.80  ICD-9-CM: 331.0, 294.10  2017 - Present Yes        Benign essential HTN ICD-10-CM: I10  ICD-9-CM: 401.1  2015 - Present Yes                 Discharge Diagnosis: Provoked Seizure 2/2 benzo withdrawal  Did Patient have Sepsis (YES OR NO): no      Hospital Course/Interval history:  HPI: Asya Pinto is a 64 y.o. female with medical history of early onset Alzheimer's dementia, hypertension, chronic benzo use, who presented to ED with seizure. Patient altered and likely postictal my examination the ED, Gilberto history obtained from  at bedside.  states that he put patient to bed last night in her normal state of health without any acute issues. She woke up in walk downstairs, which is not unusual for her with her underlying dementia.  stayed in bed with her for some time after, and subsequently found her to be convulsing, with whole body shaking. He does not know if she lost any urine or stool, but thinks that she may have had some urinary incontinence. No evidence of tongue biting. Of note, patient is on Ativan 1 mg 3 times daily for her anxiety and agitation. Yesterday  noticed that she had ran out and she did not receive any Ativan yesterday.  denies any complaints at home, although patient mostly confused at baseline and does not make much sense with her words. Denies any fevers, chills, pain, dysuria, urinary issues, diarrhea, cough or congestion. He denies any previous personal or family history of seizure disorder. In the ED, patient again had seizure activity with whole body convulsing. Her seizures aborted with Ativan administration. She was post-ictal in the ED, confused and not making any sense with her words. Labs positive for mild UTI and IGNACIO. Patient was seen by neurology during her hospitalization with concern for unprovoked versus provoked seizure. Initially started on Keppra 500 mg twice daily, but neurology decided to discontinue this medication as her seizure was suspected to be provoked secondary to benzo withdrawal. She had EEG on admission which was concerning with diffuse slowing consistent with the known Alzheimer's disease. She had repeat EEG several days later which was improved from prior. We were concerned that Keppra was also making patient more altered above her neurologic baseline. Patient also presented with UA consistent with UTI and she was given Rocephin IV while hospitalized. She is transition to Cefpodoxime in the outpatient setting to complete her course for UTI. She had no seizure activity after coming to the neuro floor, and only had one seizure in the ED after initial seizure at home.  Continued patient's home lorazepam, however at lower dose, to prevent further benzo withdrawal.       Assessment/Plan:  #Seizures  - s/p Ativan IV in ED for seizure activity  - likely from benzo withdrawal, UTI but may be at risk with underlying dementia  - cont on Keppra IV BID  - will cont with ativan 0.5mg BID at lower dose (takes 1mg TID) to prevent possible withdrawal seizures  - brain MRI negative for infarct or other abnormality  - EEG on admission with oddly abnormal EEG with diffuse slowing consistent with Alzheimer's disease  - ativan IV prn for seizure activity  - consulted neurology for assistance, follows Dr. Salud Kaufman with neurology in outpatient setting     4/9: Neurology recommending repeat EEG today and will determine further AED needs pending this. Cont with keppra for now  4/10: Repeat EEG improved from prior. Neurology stopped Keppra, as this is 's preference as well.   4/11: cont off Keppra     #Acute Hypoxic Respiratory Failure  - able to be weaned off 2L NC  - CXR clear on admission, likely atelectasis  - stop IVF if contributing  - encourage incentive spirometry use     #UTI  - UA mildly positive  - UCx with GNR, await speciation and specificity  - s/p rocephin, transition to cefpodoxime      #IGNACIO  - Cr 1.09 on admission  - improved to 0.74 on IVF, stop IVF     #Alzheimers Dementia  - cont home namenda and aricept  - hold home seroquel with patient not back to neurologic baseline, will continue home dose of seroquel upon discharge. - cont home lexapro qam as patient takes in morning     #HTN/tremor  - cont home toprol and propanolol       Consults:   IP CONSULT TO NEUROLOGY     Disposition: Home Health Care Svc  Diet:   DIET REGULAR  Code Status: Full Code        Follow up with Dr. Salud Kaufman with neurology at [address], please call his office to make appointment in 1-2 days. Current Discharge Medication List      START taking these medications    Details   docusate (COLACE) 50 mg/5 mL liquid Take 10 mL by mouth daily for 30 days. Qty: 300 mL, Refills: 0      cefpodoxime (VANTIN) 100 mg tablet Take 1 Tab by mouth two (2) times a day for 4 days. Qty: 8 Tab, Refills: 0         CONTINUE these medications which have CHANGED    Details   LORazepam (Ativan) 1 mg tablet Take 0.5 Tabs by mouth three (3) times daily for 10 days.  Max Daily Amount: 1.5 mg.  Qty: 15 Tab, Refills: 0    Associated Diagnoses: Dementia with behavioral disturbance, unspecified dementia type ([facility]         CONTINUE these medications which have NOT CHANGED    Details   metoprolol succinate (Toprol XL) 50 mg XL tablet Take 50 mg by mouth daily. propranoloL (INDERAL) 20 mg tablet Take 20 mg by mouth two (2) times a day. QUEtiapine (SEROqueL) 25 mg tablet Take 25 mg by mouth nightly.       escitalopram oxalate (LEXAPRO) 10 mg tablet 1 every day for depression  Qty: 90 Tab, Refills: 12    Associated Diagnoses: Mild depression ([facility]      donepezil (ARICEPT) 10 mg tablet 1 every day for memory  Qty: 90 Tab, Refills: 6    Associated Diagnoses: Early onset Alzheimer's dementia without behavioral disturbance (HCC)      memantine (NAMENDA) 10 mg tablet 1 every day for memory  Qty: 90 Tab, Refills: 12    Associated Diagnoses: Early onset Alzheimer's dementia without behavioral disturbance (HCC)      metroNIDAZOLE (METROGEL) 1 % topical gel Apply qhs for rosacea  Qty: 45 g, Refills: 0    Associated Diagnoses: Rosacea             Medications Discontinued During This Encounter   Medication Reason    haloperidol lactate (HALDOL) injection 10 mg     amLODIPine-benazepril (LOTREL) 5-40 mg per capsule Not A Current Medication    estradiol (ESTRACE) 0.5 mg tablet Not A Current Medication    progesterone (PROMETRIUM) 100 mg capsule Not A Current Medication    cefTRIAXone (ROCEPHIN) 1 g in 0.9% sodium chloride (MBP/ADV) 50 mL MBP     enoxaparin (LOVENOX) injection 40 mg     amLODIPine (NORVASC) tablet 5 mg     0.9% sodium chloride infusion     escitalopram oxalate (LEXAPRO) tablet 10 mg     docusate sodium (COLACE) capsule 100 mg     levETIRAcetam (KEPPRA) 500 mg in 0.9% sodium chloride (MBP/ADV) 100 mL MBP     amLODIPine (NORVASC) tablet 10 mg     amLODIPine (NORVASC) tablet 5 mg     LORazepam (Ativan) 0.5 mg tablet Discontinued at Discharge    cefpodoxime (VANTIN) 100 mg tablet REORDER    LORazepam (Ativan) 1 mg tablet REORDER         Follow Up Orders: Follow-up Appointments   Procedures    FOLLOW UP VISIT Appointment in: One Month Neurology follow up in 3-4 weeks after discharge with Dr. Jo Carter with [facility] [phone]. Neurology follow up in 3-4 weeks after discharge with Dr. Jo Carter with [facility] [phone]. Standing Status:   Standing     Number of Occurrences:   1     Order Specific Question:   Appointment in     Answer:   One Month         Follow-up Information     Follow up With Specialties Details Why Contact Info    Cresencio Correa MD Family Medicine Call in 2 days  [address] [address]      Kelsie Arreola MD Neurology In 1 week  [address] [facility] [address]  [phone]              Discharge Exam:    Patient Vitals for the past 24 hrs:   Temp Pulse Resp BP SpO2   04/11/21 0745 99 °F (37.2 °C) 81 16 (!) 147/97 90 %   04/11/21 0502 98.9 °F (37.2 °C) 77 16 131/81 90 %   04/11/21 0030 99.9 °F (37.7 °C) 74 18 126/80 90 %   04/10/21 2016 99.1 °F (37.3 °C) 80 18 120/81 90 %   04/10/21 1555 98.2 °F (36.8 °C) 78 18 (!) 147/97 92 %   04/10/21 1135 99 °F (37.2 °C) 73 16 118/76 94 %     Oxygen Therapy  O2 Sat (%): 90 % (04/11/21 0745)  Pulse via Oximetry: 68 beats per minute (04/08/21 1107)  O2 Device: Nasal cannula (04/08/21 1107)  O2 Flow Rate (L/min): 2 l/min (04/08/21 1107)    Estimated body mass index is 28.19 kg/m² as calculated from the following:    Height as of this encounter: 5' 7\" (1.702 m). Weight as of this encounter: 81.6 kg (180 lb). Intake/Output Summary (Last 24 hours) at 4/11/2021 1014  Last data filed at 4/11/2021 0502  Gross per 24 hour   Intake    Output 2050 ml   Net -2050 ml       *Note that automatically entered I/Os may not be accurate; dependent on patient compliance with collection and accurate  by assistants.     Physical Exam:   General:  No acute distress, speaking in one word sentences, no use of accessory muscles   HEENT:  Pupils equal and reactive to light and accommodation, oropharynx is clear   Neck:   Supple, no lymphadenopathy, no JVD   Lungs:  Clear to auscultation bilaterally   CV:  Regular rate and rhythm with normal S1 and S2   Abdomen: Soft, nontender, nondistended, normoactive bowel sounds   Extremities:  No cyanosis clubbing or edema   Neuro:  Nonfocal, A&O x2  Psych:  Confused, demented      All Labs from Last 24 Hrs:  Recent Results (from the past 24 hour(s))   METABOLIC PANEL, BASIC    Collection Time: 04/11/21  4:34 AM   Result Value Ref Range    Sodium 138 136 - 145 mmol/L    Potassium 3.8 3.5 - 5.1 mmol/L    Chloride 105 98 - 107 mmol/L    CO2 28 21 - 32 mmol/L    Anion gap 5 (L) 7 - 16 mmol/L    Glucose 105 (H) 65 - 100 mg/dL    BUN 14 6 - 23 MG/DL    Creatinine 0.72 0.6 - 1.0 MG/DL    GFR est AA >60 >60 ml/min/1.73m2    GFR est non-AA >60 >60 ml/min/1.73m2    Calcium 9.0 8.3 - 10.4 MG/DL   CBC WITH AUTOMATED DIFF    Collection Time: 04/11/21  4:34 AM   Result Value Ref Range    WBC 11.6 (H) 4.3 - 11.1 K/uL    RBC 3.60 (L) 4.05 - 5.2 M/uL    HGB 11.3 (L) 11.7 - 15.4 g/dL    HCT 34.2 (L) 35.8 - 46.3 %    MCV 95.0 79.6 - 97.8 FL    MCH 31.4 26.1 - 32.9 PG    MCHC 33.0 31.4 - 35.0 g/dL    RDW 12.3 11.9 - 14.6 %    PLATELET 196 337 - 797 K/uL    MPV 8.9 (L) 9.4 - 12.3 FL    ABSOLUTE NRBC 0.00 0.0 - 0.2 K/uL    DF AUTOMATED      NEUTROPHILS 51 43 - 78 %    LYMPHOCYTES 31 13 - 44 %    MONOCYTES 11 4.0 - 12.0 %    EOSINOPHILS 5 0.5 - 7.8 %    BASOPHILS 0 0.0 - 2.0 %    IMMATURE GRANULOCYTES 1 0.0 - 5.0 %    ABS. NEUTROPHILS 5.9 1.7 - 8.2 K/UL    ABS. LYMPHOCYTES 3.6 0.5 - 4.6 K/UL    ABS. MONOCYTES 1.3 0.1 - 1.3 K/UL    ABS. EOSINOPHILS 0.6 0.0 - 0.8 K/UL    ABS. BASOPHILS 0.0 0.0 - 0.2 K/UL    ABS. IMM.  GRANS. 0.1 0.0 - 0.5 K/UL       All Micro Results     Procedure Component Value Units Date/Time    CULTURE, URINE [[id number]]  (Abnormal) Collected: 04/07/21 1200    Order Status: Completed Specimen: Urine from Clean catch Updated: 04/11/21 0926     Special Requests: NO SPECIAL REQUESTS        Culture result:       >100,000 COLONIES/mL GRAM NEGATIVE RODS                  <10,000 COLONIES/mL MIXED SKIN MUSA ISOLATED                  IDENTIFICATION AND SUSCEPTIBILITY TO FOLLOW SENT TO [facility] FOR TESTING ON 4/12                SARS-CoV-2 Lab Results  \"Novel Coronavirus\" Test: No results found for: COV2NT   \"Emergent Disease\" Test: No results found for: EDPR  \"SARS-COV-2\" Test: No results found for: XGCOVT  Rapid Test:   No results found for: COVR         Diagnostic Imaging/Tests:   Mri Brain W Wo Cont    Result Date: 4/7/2021  Age-appropriate MR brain without acute abnormality. Ct Head Wo Cont    Result Date: 4/7/2021  1. No acute intracranial process evident by noncontrast CT study of the head. 2. Chronic cortical volume loss which does appear advanced for the patient's age. This report was made using voice transcription. Despite my best efforts to avoid any, transcription errors may persist. If there is any question about the accuracy of the report or need for clarification, then please call [phone], or text me through [employer] for clarification or correction. Xr Chest Port    Result Date: 4/7/2021  No evidence of an acute intrathoracic process. Duplex Upper Ext Venous Bilat    Result Date: 4/9/2021  1. No evidence of deep vein thrombosis. Echocardiogram/EKG results:  No results found for this visit on 04/07/21.     EKG Results     Procedure 720 Value Units Date/Time    EKG, 12 LEAD, INITIAL [[id number]] Collected: 04/07/21 0054    Order Status: Completed Updated: 04/07/21 1158     Ventricular Rate 64 BPM      Atrial Rate 64 BPM      P-R Interval 172 ms      QRS Duration 98 ms      Q-T Interval 428 ms      QTC Calculation (Bezet) 441 ms      Calculated P Axis 38 degrees      Calculated R Axis 67 degrees      Calculated T Axis 83 degrees      Diagnosis --     !! AGE AND GENDER SPECIFIC ECG ANALYSIS !! Normal sinus rhythm  Nonspecific T wave abnormality  Abnormal ECG  When compared with ECG of 13-SEP-2000 06:57,  Vent. rate has decreased BY  42 BPM    Confirmed by ST RAIMUNDO ADKINS MD ()FRANCES ([id number]) on 4/7/2021 11:58:36 AM            Results for orders placed or performed during the hospital encounter of 04/07/21   EKG, 12 LEAD, INITIAL   Result Value Ref Range    Ventricular Rate 64 BPM    Atrial Rate 64 BPM    P-R Interval 172 ms    QRS Duration 98 ms    Q-T Interval 428 ms    QTC Calculation (Bezet) 441 ms    Calculated P Axis 38 degrees    Calculated R Axis 67 degrees    Calculated T Axis 83 degrees    Diagnosis       !! AGE AND GENDER SPECIFIC ECG ANALYSIS !! Normal sinus rhythm  Nonspecific T wave abnormality  Abnormal ECG  When compared with ECG of 13-SEP-2000 06:57,  Vent. rate has decreased BY  42 BPM    Confirmed by ST RAIMUNDO ADKINS MD ()FRANCES ([id number]) on 4/7/2021 11:58:36 AM         Procedures done this admission:  * No surgery found *        Time spent in patient discharge planning and coordination 35  minutes.       Signed By: Kayla Bridges MD   [employer] [facility]    April 11, 2021  10:14 AM

## 2021-04-11 NOTE — PROGRESS NOTES
CM advised that patient needs HH, PT/RN/OT. CM spoke with patient's spouse at bedside, who did not have a preference for [facility]. Order placed, referral sent to [facility]. CM also made aware that patient needed a RW and BSC. Orders placed, appropriate pw signed and faxed to [facility]. DME's delivered to patient's room. Patient will need medical transport home. CM contacted [employer]. ETA of 1-1:15. Spouse advised of d/c eta. Packet completed and given to . No other needs notified at this time.      Care Management Interventions  PCP Verified by CM: Lucia Don MD)  Transition of Care Consult (CM Consult): Discharge Planning  Discharge Durable Medical Equipment: Yes(BSC and RW)  Physical Therapy Consult: Yes  Occupational Therapy Consult: Yes  Speech Therapy Consult: Yes  Current Support Network: Own Home, Lives with Spouse, Has Personal Caregivers  Confirm Follow Up Transport: Other (see comment)([facility])  Discharge Location  Discharge Placement: Home with home health(SF)

## 2021-04-13 ENCOUNTER — HOME CARE VISIT (OUTPATIENT)
Dept: SCHEDULING | Facility: HOME HEALTH | Age: 61
End: 2021-04-13
Payer: MEDICARE

## 2021-04-13 VITALS
OXYGEN SATURATION: 95 % | RESPIRATION RATE: 18 BRPM | HEART RATE: 63 BPM | DIASTOLIC BLOOD PRESSURE: 60 MMHG | SYSTOLIC BLOOD PRESSURE: 122 MMHG | TEMPERATURE: 96.9 F

## 2021-04-13 PROCEDURE — 3331090001 HH PPS REVENUE CREDIT

## 2021-04-13 PROCEDURE — G0299 HHS/HOSPICE OF RN EA 15 MIN: HCPCS

## 2021-04-13 PROCEDURE — 400018 HH-NO PAY CLAIM PROCEDURE

## 2021-04-13 PROCEDURE — 3331090002 HH PPS REVENUE DEBIT

## 2021-04-13 PROCEDURE — 400013 HH SOC

## 2021-04-14 ENCOUNTER — HOME CARE VISIT (OUTPATIENT)
Dept: SCHEDULING | Facility: HOME HEALTH | Age: 61
End: 2021-04-14
Payer: MEDICARE

## 2021-04-14 VITALS
SYSTOLIC BLOOD PRESSURE: 110 MMHG | TEMPERATURE: 97.4 F | OXYGEN SATURATION: 92 % | HEART RATE: 68 BPM | DIASTOLIC BLOOD PRESSURE: 74 MMHG | RESPIRATION RATE: 16 BRPM

## 2021-04-14 PROCEDURE — 3331090002 HH PPS REVENUE DEBIT

## 2021-04-14 PROCEDURE — G0152 HHCP-SERV OF OT,EA 15 MIN: HCPCS

## 2021-04-14 PROCEDURE — 3331090001 HH PPS REVENUE CREDIT

## 2021-04-15 ENCOUNTER — HOME CARE VISIT (OUTPATIENT)
Dept: SCHEDULING | Facility: HOME HEALTH | Age: 61
End: 2021-04-15
Payer: MEDICARE

## 2021-04-15 VITALS
RESPIRATION RATE: 17 BRPM | TEMPERATURE: 97.8 F | HEART RATE: 80 BPM | OXYGEN SATURATION: 93 % | SYSTOLIC BLOOD PRESSURE: 122 MMHG | DIASTOLIC BLOOD PRESSURE: 70 MMHG

## 2021-04-15 PROCEDURE — G0299 HHS/HOSPICE OF RN EA 15 MIN: HCPCS

## 2021-04-15 PROCEDURE — 3331090002 HH PPS REVENUE DEBIT

## 2021-04-15 PROCEDURE — 3331090001 HH PPS REVENUE CREDIT

## 2021-04-16 ENCOUNTER — HOME CARE VISIT (OUTPATIENT)
Dept: SCHEDULING | Facility: HOME HEALTH | Age: 61
End: 2021-04-16
Payer: MEDICARE

## 2021-04-16 VITALS
DIASTOLIC BLOOD PRESSURE: 70 MMHG | TEMPERATURE: 97.3 F | SYSTOLIC BLOOD PRESSURE: 124 MMHG | HEART RATE: 70 BPM | RESPIRATION RATE: 16 BRPM

## 2021-04-16 LAB
ANTIMICROBIAL SUSCEPTIBILITY, 080569: ABNORMAL
ANTIMICROBIAL SUSCEPTIBILITY, 080575: ABNORMAL
BACTERIA ISLT: ABNORMAL
RESULT 1, 080565: ABNORMAL
RESULT 1, 080571: ABNORMAL
RESULT 2, 080566: ABNORMAL
RESULT 3, 080567: ABNORMAL
RESULT 4, 080568: ABNORMAL
SPECIMEN SOURCE: ABNORMAL

## 2021-04-16 PROCEDURE — 3331090001 HH PPS REVENUE CREDIT

## 2021-04-16 PROCEDURE — G0151 HHCP-SERV OF PT,EA 15 MIN: HCPCS

## 2021-04-16 PROCEDURE — 3331090002 HH PPS REVENUE DEBIT

## 2021-04-17 PROCEDURE — 3331090002 HH PPS REVENUE DEBIT

## 2021-04-17 PROCEDURE — 3331090001 HH PPS REVENUE CREDIT

## 2021-04-18 PROCEDURE — 3331090001 HH PPS REVENUE CREDIT

## 2021-04-18 PROCEDURE — 3331090002 HH PPS REVENUE DEBIT

## 2021-04-19 LAB
BACTERIA SPEC CULT: ABNORMAL
SERVICE CMNT-IMP: ABNORMAL

## 2021-04-19 PROCEDURE — 3331090001 HH PPS REVENUE CREDIT

## 2021-04-19 PROCEDURE — 3331090002 HH PPS REVENUE DEBIT

## 2021-04-20 ENCOUNTER — HOME CARE VISIT (OUTPATIENT)
Dept: SCHEDULING | Facility: HOME HEALTH | Age: 61
End: 2021-04-20
Payer: MEDICARE

## 2021-04-20 VITALS
OXYGEN SATURATION: 99 % | HEART RATE: 84 BPM | TEMPERATURE: 97.9 F | RESPIRATION RATE: 17 BRPM | DIASTOLIC BLOOD PRESSURE: 69 MMHG | SYSTOLIC BLOOD PRESSURE: 124 MMHG

## 2021-04-20 PROCEDURE — 3331090001 HH PPS REVENUE CREDIT

## 2021-04-20 PROCEDURE — 3331090002 HH PPS REVENUE DEBIT

## 2021-04-20 PROCEDURE — G0158 HHC OT ASSISTANT EA 15: HCPCS

## 2021-04-21 ENCOUNTER — HOME CARE VISIT (OUTPATIENT)
Dept: SCHEDULING | Facility: HOME HEALTH | Age: 61
End: 2021-04-21
Payer: MEDICARE

## 2021-04-21 VITALS
HEART RATE: 74 BPM | OXYGEN SATURATION: 96 % | DIASTOLIC BLOOD PRESSURE: 70 MMHG | TEMPERATURE: 98.7 F | SYSTOLIC BLOOD PRESSURE: 110 MMHG | RESPIRATION RATE: 16 BRPM

## 2021-04-21 PROCEDURE — G0299 HHS/HOSPICE OF RN EA 15 MIN: HCPCS

## 2021-04-21 PROCEDURE — 3331090002 HH PPS REVENUE DEBIT

## 2021-04-21 PROCEDURE — 3331090001 HH PPS REVENUE CREDIT

## 2021-04-22 ENCOUNTER — HOME CARE VISIT (OUTPATIENT)
Dept: SCHEDULING | Facility: HOME HEALTH | Age: 61
End: 2021-04-22
Payer: MEDICARE

## 2021-04-22 VITALS
RESPIRATION RATE: 16 BRPM | OXYGEN SATURATION: 96 % | HEART RATE: 87 BPM | SYSTOLIC BLOOD PRESSURE: 116 MMHG | DIASTOLIC BLOOD PRESSURE: 78 MMHG | TEMPERATURE: 97.9 F

## 2021-04-22 PROCEDURE — G0158 HHC OT ASSISTANT EA 15: HCPCS

## 2021-04-22 PROCEDURE — 3331090002 HH PPS REVENUE DEBIT

## 2021-04-22 PROCEDURE — 3331090001 HH PPS REVENUE CREDIT

## 2021-04-23 PROCEDURE — 3331090002 HH PPS REVENUE DEBIT

## 2021-04-23 PROCEDURE — 3331090001 HH PPS REVENUE CREDIT

## 2021-04-24 PROCEDURE — 3331090002 HH PPS REVENUE DEBIT

## 2021-04-24 PROCEDURE — 3331090001 HH PPS REVENUE CREDIT

## 2021-04-25 PROCEDURE — 3331090001 HH PPS REVENUE CREDIT

## 2021-04-25 PROCEDURE — 3331090002 HH PPS REVENUE DEBIT

## 2021-04-26 PROCEDURE — 3331090001 HH PPS REVENUE CREDIT

## 2021-04-26 PROCEDURE — 3331090002 HH PPS REVENUE DEBIT

## 2021-04-27 ENCOUNTER — HOME CARE VISIT (OUTPATIENT)
Dept: SCHEDULING | Facility: HOME HEALTH | Age: 61
End: 2021-04-27
Payer: MEDICARE

## 2021-04-27 VITALS
RESPIRATION RATE: 17 BRPM | OXYGEN SATURATION: 98 % | DIASTOLIC BLOOD PRESSURE: 88 MMHG | HEART RATE: 55 BPM | SYSTOLIC BLOOD PRESSURE: 130 MMHG | TEMPERATURE: 98 F

## 2021-04-27 PROCEDURE — 3331090002 HH PPS REVENUE DEBIT

## 2021-04-27 PROCEDURE — G0158 HHC OT ASSISTANT EA 15: HCPCS

## 2021-04-27 PROCEDURE — 3331090001 HH PPS REVENUE CREDIT

## 2021-04-28 PROCEDURE — 3331090001 HH PPS REVENUE CREDIT

## 2021-04-28 PROCEDURE — 3331090002 HH PPS REVENUE DEBIT

## 2021-04-29 ENCOUNTER — HOME CARE VISIT (OUTPATIENT)
Dept: SCHEDULING | Facility: HOME HEALTH | Age: 61
End: 2021-04-29
Payer: MEDICARE

## 2021-04-29 VITALS
RESPIRATION RATE: 16 BRPM | TEMPERATURE: 97.6 F | HEART RATE: 64 BPM | SYSTOLIC BLOOD PRESSURE: 108 MMHG | OXYGEN SATURATION: 97 % | DIASTOLIC BLOOD PRESSURE: 70 MMHG

## 2021-04-29 PROCEDURE — 3331090001 HH PPS REVENUE CREDIT

## 2021-04-29 PROCEDURE — G0152 HHCP-SERV OF OT,EA 15 MIN: HCPCS

## 2021-04-29 PROCEDURE — 3331090002 HH PPS REVENUE DEBIT
